# Patient Record
Sex: FEMALE | Race: WHITE | NOT HISPANIC OR LATINO | Employment: FULL TIME | URBAN - METROPOLITAN AREA
[De-identification: names, ages, dates, MRNs, and addresses within clinical notes are randomized per-mention and may not be internally consistent; named-entity substitution may affect disease eponyms.]

---

## 2017-08-17 ENCOUNTER — CLINICAL SUPPORT (OUTPATIENT)
Dept: OTHER | Facility: CLINIC | Age: 37
End: 2017-08-17
Payer: COMMERCIAL

## 2017-08-17 VITALS
SYSTOLIC BLOOD PRESSURE: 164 MMHG | HEIGHT: 66 IN | DIASTOLIC BLOOD PRESSURE: 74 MMHG | WEIGHT: 178 LBS | BODY MASS INDEX: 28.61 KG/M2

## 2017-08-17 DIAGNOSIS — Z00.8 HEALTH EXAMINATION IN POPULATION SURVEYS: Primary | ICD-10-CM

## 2017-08-17 LAB
GLUCOSE SERPL-MCNC: 105 MG/DL (ref 60–140)
POC CHOLESTEROL, HDL: 52 MG/DL (ref 40–?)
POC CHOLESTEROL, LDL: 110 MG/DL (ref ?–160)
POC CHOLESTEROL, TOTAL: 188 MG/DL (ref ?–240)
POC GLUCOSE FASTING: NORMAL MG/DL (ref 60–110)
POC TOTAL CHOLESTEROL / HDL RATIO: 3.6 (ref ?–6)
POC TRIGLYCERIDES: 130 MG/DL (ref ?–160)

## 2017-08-17 PROCEDURE — 82947 ASSAY GLUCOSE BLOOD QUANT: CPT | Mod: QW,S$GLB,, | Performed by: INTERNAL MEDICINE

## 2017-08-17 PROCEDURE — 99401 PREV MED CNSL INDIV APPRX 15: CPT | Mod: S$GLB,,, | Performed by: INTERNAL MEDICINE

## 2017-08-17 PROCEDURE — 80061 LIPID PANEL: CPT | Mod: QW,S$GLB,, | Performed by: INTERNAL MEDICINE

## 2017-10-16 ENCOUNTER — OFFICE VISIT (OUTPATIENT)
Dept: OBSTETRICS AND GYNECOLOGY | Facility: CLINIC | Age: 37
End: 2017-10-16
Payer: COMMERCIAL

## 2017-10-16 ENCOUNTER — LAB VISIT (OUTPATIENT)
Dept: LAB | Facility: HOSPITAL | Age: 37
End: 2017-10-16
Attending: OBSTETRICS & GYNECOLOGY
Payer: COMMERCIAL

## 2017-10-16 VITALS
WEIGHT: 184.06 LBS | SYSTOLIC BLOOD PRESSURE: 106 MMHG | HEIGHT: 66 IN | BODY MASS INDEX: 29.58 KG/M2 | DIASTOLIC BLOOD PRESSURE: 72 MMHG

## 2017-10-16 DIAGNOSIS — N91.2 AMENORRHEA: ICD-10-CM

## 2017-10-16 DIAGNOSIS — R21 RASH: ICD-10-CM

## 2017-10-16 DIAGNOSIS — N91.2 AMENORRHEA: Primary | ICD-10-CM

## 2017-10-16 LAB
ABO + RH BLD: NORMAL
B-HCG UR QL: POSITIVE
BASOPHILS # BLD AUTO: 0.04 K/UL
BASOPHILS NFR BLD: 0.5 %
BLD GP AB SCN CELLS X3 SERPL QL: NORMAL
CTP QC/QA: YES
DIFFERENTIAL METHOD: ABNORMAL
EOSINOPHIL # BLD AUTO: 0.2 K/UL
EOSINOPHIL NFR BLD: 2.6 %
ERYTHROCYTE [DISTWIDTH] IN BLOOD BY AUTOMATED COUNT: 14.3 %
HCT VFR BLD AUTO: 38.2 %
HGB BLD-MCNC: 12.2 G/DL
IMM GRANULOCYTES # BLD AUTO: 0.03 K/UL
IMM GRANULOCYTES NFR BLD AUTO: 0.4 %
LYMPHOCYTES # BLD AUTO: 1.7 K/UL
LYMPHOCYTES NFR BLD: 22.2 %
MCH RBC QN AUTO: 26 PG
MCHC RBC AUTO-ENTMCNC: 31.9 G/DL
MCV RBC AUTO: 81 FL
MONOCYTES # BLD AUTO: 0.7 K/UL
MONOCYTES NFR BLD: 9.1 %
NEUTROPHILS # BLD AUTO: 4.9 K/UL
NEUTROPHILS NFR BLD: 65.2 %
NRBC BLD-RTO: 0 /100 WBC
PLATELET # BLD AUTO: 327 K/UL
PMV BLD AUTO: 10.4 FL
RBC # BLD AUTO: 4.69 M/UL
WBC # BLD AUTO: 7.56 K/UL

## 2017-10-16 PROCEDURE — 85025 COMPLETE CBC W/AUTO DIFF WBC: CPT

## 2017-10-16 PROCEDURE — 86703 HIV-1/HIV-2 1 RESULT ANTBDY: CPT

## 2017-10-16 PROCEDURE — 86900 BLOOD TYPING SEROLOGIC ABO: CPT

## 2017-10-16 PROCEDURE — 86850 RBC ANTIBODY SCREEN: CPT

## 2017-10-16 PROCEDURE — 81025 URINE PREGNANCY TEST: CPT | Mod: S$GLB,,, | Performed by: OBSTETRICS & GYNECOLOGY

## 2017-10-16 PROCEDURE — 86762 RUBELLA ANTIBODY: CPT

## 2017-10-16 PROCEDURE — 99203 OFFICE O/P NEW LOW 30 MIN: CPT | Mod: S$GLB,,, | Performed by: OBSTETRICS & GYNECOLOGY

## 2017-10-16 PROCEDURE — 87340 HEPATITIS B SURFACE AG IA: CPT

## 2017-10-16 PROCEDURE — 99999 PR PBB SHADOW E&M-EST. PATIENT-LVL III: CPT | Mod: PBBFAC,,, | Performed by: OBSTETRICS & GYNECOLOGY

## 2017-10-16 PROCEDURE — 86592 SYPHILIS TEST NON-TREP QUAL: CPT

## 2017-10-16 NOTE — PROGRESS NOTES
Chief Complaint: Absence of Menses     HPI:      Sarbjit Boateng is a 37 y.o.  who presents complaining of absence of menses.  She reports nausea and mood swings. Denies vaginal bleeding or vomiting other then when she induces vomiting to make herself feel better. H/o SABs x 2. Neither required treatment.     Past Medical History:   Diagnosis Date    Anxiety     Depression     Gestational diabetes      Past Surgical History:   Procedure Laterality Date    WISDOM TOOTH EXTRACTION       Social History   Substance Use Topics    Smoking status: Never Smoker    Smokeless tobacco: Never Used    Alcohol use 0.0 oz/week      Comment: occassional drinking, 3-4 in one sitting      Family History   Problem Relation Age of Onset    Cancer Mother 53     breast cancer    Hypertension Mother     Hyperlipidemia Mother     Diabetes Mother     Breast cancer Mother     Cancer Father      colon cancer, in remission    Colon cancer Father     Cancer Maternal Uncle      tongue cancer    Cancer Maternal Grandmother      ovarian cancer    Ovarian cancer Maternal Grandmother     Cancer Maternal Grandfather      liver cancer    Cancer Paternal Grandfather      colon cancer    Colon cancer Paternal Grandfather      OB History    Para Term  AB Living   4 1 1   2 1   SAB TAB Ectopic Multiple Live Births   2       1      # Outcome Date GA Lbr [place]/2nd Weight Sex Delivery Anes PTL Lv   4 Current            3 Term  40w0d  3.487 kg (7 lb 11 oz) F Vag-Spont   KENDALL   2 SAB            1 SAB                   ROS:     GENERAL: Denies weight gain or weight loss. Feeling well overall.   SKIN: Denies rash or lesions.   BREASTS: Denies lumps or nipple discharge. + tenderness.  ABDOMEN: Denies abdominal pain, constipation, diarrhea. nausea and rare vomiting  URINARY: Denies dysuria, hematuria. + frequency.  NEUROLOGIC: Denies syncope or weakness.   PSYCHIATRIC: Denies depression, anxiety or mood  "swings.    Physical Exam:      PHYSICAL EXAM:  /72   Ht 5' 6" (1.676 m)   Wt 83.5 kg (184 lb 1.4 oz)   LMP 2017   BMI 29.71 kg/m²   Body mass index is 29.71 kg/m².     APPEARANCE: Well nourished, well developed, in no acute distress.  PSYCH: Appropriate mood and affect.  EXTREMITIES: No edema.    Assessment/Plan:       ICD-10-CM ICD-9-CM    1. Amenorrhea N91.2 626.0 POCT urine pregnancy       RTC in 2 weeks for initial OB exam and U/S.    Counselin. Patient was counseled today on proper weight gain based on the [employer]'s recommendations based on her pre-pregnancy weight.   2. Discussed foods to avoid in pregnancy (i.e. sushi, fish that are high in mercury, deli meat, and unpasteurized cheeses).   3. Discussed prenatal vitamin options (i.e. stool softener, DHA).   4. Optional genetic testing discussed.   5. Safety of exercise discussed with patient, and continued active lifestyle encouraged.  6. Zika virus precautions for both patient and her spouse.  7. Normal course of prenatal care reviewed.  8. Medications safe in pregnancy discussed and list provided.    30 minutes of face-to-face discussion occurred during today's visit.     Use of the [employer] Patient Portal discussed and encouraged during today's visit.       "

## 2017-10-17 LAB
HBV SURFACE AG SERPL QL IA: NEGATIVE
HIV 1+2 AB+HIV1 P24 AG SERPL QL IA: NEGATIVE
RPR SER QL: NORMAL
RUBV IGG SER-ACNC: 33 IU/ML
RUBV IGG SER-IMP: REACTIVE

## 2017-10-18 ENCOUNTER — TELEPHONE (OUTPATIENT)
Dept: OBSTETRICS AND GYNECOLOGY | Facility: CLINIC | Age: 37
End: 2017-10-18

## 2017-10-18 NOTE — TELEPHONE ENCOUNTER
----- Message from Giulia Garber MD sent at 10/18/2017  8:41 AM CDT -----  Please let Sarbjit know that all of her prenatal labs look good and she's starting off this pregnancy with a clean bill of health.

## 2017-11-01 ENCOUNTER — ROUTINE PRENATAL (OUTPATIENT)
Dept: OBSTETRICS AND GYNECOLOGY | Facility: CLINIC | Age: 37
End: 2017-11-01
Payer: COMMERCIAL

## 2017-11-01 ENCOUNTER — PROCEDURE VISIT (OUTPATIENT)
Dept: OBSTETRICS AND GYNECOLOGY | Facility: CLINIC | Age: 37
End: 2017-11-01
Payer: COMMERCIAL

## 2017-11-01 ENCOUNTER — TELEPHONE (OUTPATIENT)
Dept: OBSTETRICS AND GYNECOLOGY | Facility: CLINIC | Age: 37
End: 2017-11-01

## 2017-11-01 VITALS
WEIGHT: 183.44 LBS | BODY MASS INDEX: 29.61 KG/M2 | SYSTOLIC BLOOD PRESSURE: 124 MMHG | DIASTOLIC BLOOD PRESSURE: 76 MMHG

## 2017-11-01 DIAGNOSIS — O46.8X1 SUBCHORIONIC HEMATOMA IN FIRST TRIMESTER, SINGLE OR UNSPECIFIED FETUS: ICD-10-CM

## 2017-11-01 DIAGNOSIS — N91.2 AMENORRHEA: ICD-10-CM

## 2017-11-01 DIAGNOSIS — Z12.4 PAP SMEAR FOR CERVICAL CANCER SCREENING: ICD-10-CM

## 2017-11-01 DIAGNOSIS — Z3A.08 8 WEEKS GESTATION OF PREGNANCY: Primary | ICD-10-CM

## 2017-11-01 DIAGNOSIS — O41.8X10 SUBCHORIONIC HEMATOMA IN FIRST TRIMESTER, SINGLE OR UNSPECIFIED FETUS: ICD-10-CM

## 2017-11-01 DIAGNOSIS — Z36.89 ESTABLISH GESTATIONAL AGE, ULTRASOUND: ICD-10-CM

## 2017-11-01 PROCEDURE — 87591 N.GONORRHOEAE DNA AMP PROB: CPT

## 2017-11-01 PROCEDURE — 99999 PR PBB SHADOW E&M-EST. PATIENT-LVL III: CPT | Mod: PBBFAC,,, | Performed by: OBSTETRICS & GYNECOLOGY

## 2017-11-01 PROCEDURE — 0500F INITIAL PRENATAL CARE VISIT: CPT | Mod: S$GLB,,, | Performed by: OBSTETRICS & GYNECOLOGY

## 2017-11-01 PROCEDURE — 88175 CYTOPATH C/V AUTO FLUID REDO: CPT

## 2017-11-01 PROCEDURE — 87624 HPV HI-RISK TYP POOLED RSLT: CPT

## 2017-11-01 PROCEDURE — 76817 TRANSVAGINAL US OBSTETRIC: CPT | Mod: S$GLB,,, | Performed by: OBSTETRICS & GYNECOLOGY

## 2017-11-01 NOTE — TELEPHONE ENCOUNTER
Shirlene ob pt - pt is 8 weeks and had an appt this morning. She said she had an u/s and pap done today and since she left has been cramping really bad and feeling dizzy like she has no energy. Pt would like to know what she should do.

## 2017-11-01 NOTE — TELEPHONE ENCOUNTER
8 1/7 week OB c/o cramping, dizziness and decreased energy.  She feels like her HR is high.  Denies vaginal bleeding.  Recommended she drink a couple bottles of water and rest.  Reassured her this sounds normal.

## 2017-11-01 NOTE — PROGRESS NOTES
8w1d without major complaints.   Prenatal exam, WNL, see prenatal tabs.   Patient would like Maternity 21, number given to call today.   RTC in 4 weeks for routine PNC.

## 2017-11-01 NOTE — PROCEDURES
Procedures   Obstetrical ultrasound completed today.  See report in imaging section of [facility].

## 2017-11-02 LAB
C TRACH DNA SPEC QL NAA+PROBE: NOT DETECTED
N GONORRHOEA DNA SPEC QL NAA+PROBE: NOT DETECTED

## 2017-11-06 ENCOUNTER — TELEPHONE (OUTPATIENT)
Dept: OBSTETRICS AND GYNECOLOGY | Facility: CLINIC | Age: 37
End: 2017-11-06

## 2017-11-06 LAB
HPV16 AG SPEC QL: NEGATIVE
HPV16+18+H RISK 12 DNA CVX-IMP: NEGATIVE
HPV18 DNA SPEC QL NAA+PROBE: NEGATIVE

## 2017-11-06 RX ORDER — ONDANSETRON 4 MG/1
4 TABLET, FILM COATED ORAL DAILY PRN
Qty: 30 TABLET | Refills: 1 | Status: SHIPPED | OUTPATIENT
Start: 2017-11-06 | End: 2017-11-27 | Stop reason: SDUPTHER

## 2017-11-06 NOTE — TELEPHONE ENCOUNTER
Dr Garber pt's  calling Claudio they were seen on last Wednesday and nausea meds were suppose to be sent in.They are still waiting and need meds.Please call Claudio to let him know when this is sent.# [phone] Pharm Devin [phone]#

## 2017-11-27 ENCOUNTER — ROUTINE PRENATAL (OUTPATIENT)
Dept: OBSTETRICS AND GYNECOLOGY | Facility: CLINIC | Age: 37
End: 2017-11-27
Payer: COMMERCIAL

## 2017-11-27 VITALS — BODY MASS INDEX: 29.78 KG/M2 | DIASTOLIC BLOOD PRESSURE: 74 MMHG | WEIGHT: 184.5 LBS | SYSTOLIC BLOOD PRESSURE: 116 MMHG

## 2017-11-27 DIAGNOSIS — Z3A.11 11 WEEKS GESTATION OF PREGNANCY: Primary | ICD-10-CM

## 2017-11-27 DIAGNOSIS — O09.521 ELDERLY MULTIGRAVIDA IN FIRST TRIMESTER: ICD-10-CM

## 2017-11-27 PROCEDURE — 0502F SUBSEQUENT PRENATAL CARE: CPT | Mod: S$GLB,,, | Performed by: OBSTETRICS & GYNECOLOGY

## 2017-11-27 PROCEDURE — 99999 PR PBB SHADOW E&M-EST. PATIENT-LVL II: CPT | Mod: PBBFAC,,, | Performed by: OBSTETRICS & GYNECOLOGY

## 2017-11-27 PROCEDURE — 90686 IIV4 VACC NO PRSV 0.5 ML IM: CPT | Mod: S$GLB,,, | Performed by: OBSTETRICS & GYNECOLOGY

## 2017-11-27 PROCEDURE — 90471 IMMUNIZATION ADMIN: CPT | Mod: S$GLB,,, | Performed by: OBSTETRICS & GYNECOLOGY

## 2017-11-27 RX ORDER — ONDANSETRON 4 MG/1
4 TABLET, FILM COATED ORAL DAILY PRN
Qty: 30 TABLET | Refills: 1 | Status: SHIPPED | OUTPATIENT
Start: 2017-11-27 | End: 2018-01-31 | Stop reason: SDUPTHER

## 2017-11-27 NOTE — PROGRESS NOTES
11w6d without major complaints.   Has decided to have quad screen rather than Maternity 21 as latter is not going to be covered by her insurance.   Flu shot given today.   RTC in 1 month for routine PNC and quad screen.

## 2018-01-03 ENCOUNTER — LAB VISIT (OUTPATIENT)
Dept: LAB | Facility: HOSPITAL | Age: 38
End: 2018-01-03
Attending: OBSTETRICS & GYNECOLOGY
Payer: COMMERCIAL

## 2018-01-03 ENCOUNTER — ROUTINE PRENATAL (OUTPATIENT)
Dept: OBSTETRICS AND GYNECOLOGY | Facility: CLINIC | Age: 38
End: 2018-01-03
Payer: COMMERCIAL

## 2018-01-03 VITALS
DIASTOLIC BLOOD PRESSURE: 76 MMHG | SYSTOLIC BLOOD PRESSURE: 118 MMHG | BODY MASS INDEX: 29.84 KG/M2 | WEIGHT: 184.88 LBS

## 2018-01-03 DIAGNOSIS — Z3A.17 17 WEEKS GESTATION OF PREGNANCY: ICD-10-CM

## 2018-01-03 DIAGNOSIS — O09.521 ELDERLY MULTIGRAVIDA IN FIRST TRIMESTER: Primary | ICD-10-CM

## 2018-01-03 PROCEDURE — 81511 FTL CGEN ABNOR FOUR ANAL: CPT

## 2018-01-03 PROCEDURE — 0502F SUBSEQUENT PRENATAL CARE: CPT | Mod: S$GLB,,, | Performed by: OBSTETRICS & GYNECOLOGY

## 2018-01-03 PROCEDURE — 99999 PR PBB SHADOW E&M-EST. PATIENT-LVL II: CPT | Mod: PBBFAC,,, | Performed by: OBSTETRICS & GYNECOLOGY

## 2018-01-03 RX ORDER — LORATADINE 10 MG/1
10 TABLET ORAL DAILY
Status: ON HOLD | COMMUNITY
End: 2018-06-05 | Stop reason: HOSPADM

## 2018-01-03 NOTE — PROGRESS NOTES
17w1d without major complaints.  Quad screen drawn today.   Anatomy U/S ordered today.  RTC in 4 weeks for routine PNC.

## 2018-01-05 ENCOUNTER — TELEPHONE (OUTPATIENT)
Dept: OBSTETRICS AND GYNECOLOGY | Facility: CLINIC | Age: 38
End: 2018-01-05

## 2018-01-05 LAB
# FETUSES US: NORMAL
2ND TRIMESTER 4 SCREEN PNL SERPL: NEGATIVE
2ND TRIMESTER 4 SCREEN SERPL-IMP: NORMAL
AFP MOM SERPL: 0.8
AFP SERPL-MCNC: 28.2 NG/ML
AGE AT DELIVERY: 38
B-HCG MOM SERPL: 0.58
B-HCG SERPL-ACNC: 15.2 IU/ML
FET TS 21 RISK FROM MAT AGE: NORMAL
GA (DAYS): 1 D
GA (WEEKS): 17 WK
GA METHOD: NORMAL
IDDM PATIENT QL: NORMAL
INHIBIN A MOM SERPL: 0.76
INHIBIN A SERPL-MCNC: 112.6 PG/ML
SMOKING STATUS FTND: NO
TS 18 RISK FETUS: NORMAL
TS 21 RISK FETUS: NORMAL
U ESTRIOL MOM SERPL: 1.1
U ESTRIOL SERPL-MCNC: 1.1 NG/ML

## 2018-01-05 NOTE — TELEPHONE ENCOUNTER
----- Message from Giulia Garber MD sent at 1/5/2018 10:33 AM CST -----  Please call Sarbjit and let her know that her genetic screen is negative, everything looks good, and we'll see her in a few weeks!

## 2018-01-25 ENCOUNTER — OFFICE VISIT (OUTPATIENT)
Dept: MATERNAL FETAL MEDICINE | Facility: CLINIC | Age: 38
End: 2018-01-25
Attending: OBSTETRICS & GYNECOLOGY
Payer: COMMERCIAL

## 2018-01-25 DIAGNOSIS — Z3A.17 17 WEEKS GESTATION OF PREGNANCY: ICD-10-CM

## 2018-01-25 DIAGNOSIS — Z36.89 ENCOUNTER FOR ULTRASOUND TO CHECK FETAL GROWTH: ICD-10-CM

## 2018-01-25 PROCEDURE — 76811 OB US DETAILED SNGL FETUS: CPT | Mod: S$GLB,,, | Performed by: OBSTETRICS & GYNECOLOGY

## 2018-01-25 PROCEDURE — 99499 UNLISTED E&M SERVICE: CPT | Mod: S$GLB,,, | Performed by: OBSTETRICS & GYNECOLOGY

## 2018-01-25 PROCEDURE — 99999 PR PBB SHADOW E&M-EST. PATIENT-LVL I: CPT | Mod: PBBFAC,,, | Performed by: OBSTETRICS & GYNECOLOGY

## 2018-01-25 NOTE — PROGRESS NOTES
See ultrasound report for details    A detailed fetal anatomic ultrasound examination was performed for the following high risk indication: AMA    There is a small, sub-chorionic hematoma in the lower segment which is expected to resolve  No fetal structural malformations are identified; however, fetal imaging is incomplete today.   A follow-up study will be scheduled to complete the fetal anatomic survey.  Fetal size today is consistent with established gestational age. Cervical length is normal. Placental location is normal without evidence of previa.

## 2018-01-25 NOTE — LETTER
January 25, 2018      Giulia Garber MD  [address]  [address]  [place] [address]           [facility]  [address] [address]  Phone: [phone]          Patient: Sarbjit Boateng   MR Number: [id number]   YOB: 1980   Date of Visit: 1/25/2018       Dear Dr. Giulia Garber:    Thank you for referring Sarbjit Boateng to me for evaluation. Attached you will find relevant portions of my assessment and plan of care.    If you have questions, please do not hesitate to call me. I look forward to following Sarbjit Boateng along with you.    Sincerely,    Olivier Toro III, MD    Enclosure  CC:  No Recipients    If you would like to receive this communication electronically, please contact [email or web address] or [phone] to request more information on [employer] Link access.    For providers and/or their staff who would like to refer a patient to [facility], please contact us through our one-stop-shop provider referral line, [facility], at [phone].    If you feel you have received this communication in error or would no longer like to receive these types of communications, please e-mail [email or web address]

## 2018-01-29 NOTE — PROGRESS NOTES
21w1d without major complaints.   Anatomy U/S incomplete, follow up scheduled for 2/22.   RTC in 4 weeks for routine PNC.  1 hr GCT and CBC at next visit.

## 2018-01-31 ENCOUNTER — ROUTINE PRENATAL (OUTPATIENT)
Dept: OBSTETRICS AND GYNECOLOGY | Facility: CLINIC | Age: 38
End: 2018-01-31
Payer: COMMERCIAL

## 2018-01-31 VITALS
DIASTOLIC BLOOD PRESSURE: 64 MMHG | WEIGHT: 185.94 LBS | BODY MASS INDEX: 30.01 KG/M2 | SYSTOLIC BLOOD PRESSURE: 116 MMHG

## 2018-01-31 DIAGNOSIS — Z3A.21 21 WEEKS GESTATION OF PREGNANCY: ICD-10-CM

## 2018-01-31 DIAGNOSIS — O09.522 ELDERLY MULTIGRAVIDA IN SECOND TRIMESTER: Primary | ICD-10-CM

## 2018-01-31 PROCEDURE — 99999 PR PBB SHADOW E&M-EST. PATIENT-LVL II: CPT | Mod: PBBFAC,,, | Performed by: OBSTETRICS & GYNECOLOGY

## 2018-01-31 PROCEDURE — 0502F SUBSEQUENT PRENATAL CARE: CPT | Mod: S$GLB,,, | Performed by: OBSTETRICS & GYNECOLOGY

## 2018-01-31 RX ORDER — ONDANSETRON 4 MG/1
4 TABLET, FILM COATED ORAL DAILY PRN
Qty: 30 TABLET | Refills: 1 | Status: SHIPPED | OUTPATIENT
Start: 2018-01-31 | End: 2018-03-28 | Stop reason: SDUPTHER

## 2018-02-07 ENCOUNTER — TELEPHONE (OUTPATIENT)
Dept: OBSTETRICS AND GYNECOLOGY | Facility: CLINIC | Age: 38
End: 2018-02-07

## 2018-02-07 RX ORDER — ONDANSETRON 4 MG/1
4 TABLET, ORALLY DISINTEGRATING ORAL EVERY 6 HOURS PRN
Qty: 30 TABLET | Refills: 1 | Status: ON HOLD | OUTPATIENT
Start: 2018-02-07 | End: 2018-06-05 | Stop reason: HOSPADM

## 2018-02-07 NOTE — TELEPHONE ENCOUNTER
Shirlene pt  states he and the pt are traveling internationally and the pharm will not let them renew her Rx for ondansetron (ZOFRAN, AS HYDROCHLORIDE,) 4 MG tablet. Pharm told him that if the Dr could re-write the Rx they could possible refill it.     can be reached at:[phone]

## 2018-02-22 ENCOUNTER — OFFICE VISIT (OUTPATIENT)
Dept: MATERNAL FETAL MEDICINE | Facility: CLINIC | Age: 38
End: 2018-02-22
Payer: COMMERCIAL

## 2018-02-22 DIAGNOSIS — Z36.89 ENCOUNTER FOR ULTRASOUND TO CHECK FETAL GROWTH: ICD-10-CM

## 2018-02-22 DIAGNOSIS — O09.522 ELDERLY MULTIGRAVIDA IN SECOND TRIMESTER: ICD-10-CM

## 2018-02-22 PROCEDURE — 99499 UNLISTED E&M SERVICE: CPT | Mod: S$GLB,,, | Performed by: OBSTETRICS & GYNECOLOGY

## 2018-02-22 PROCEDURE — 76816 OB US FOLLOW-UP PER FETUS: CPT | Mod: S$GLB,,, | Performed by: OBSTETRICS & GYNECOLOGY

## 2018-02-22 NOTE — PROGRESS NOTES
OB Ultrasound Report (see PDF version under imaging tab)    Indication  ========    Follow-up evaluation of anatomy and growth.    History  ======    General History  Other: MARBIN VILLAGOMEZ    Pregnancy History  ===============    Maternal Lab Tests  Test: Quad/ Penta Screen  Result: negative, DSR 1:2900, T18 1:7100  Wants to know gender: yes    Method  ======    Transabdominal ultrasound examination. View: Sufficient.    Pregnancy  =========    Harvey pregnancy. Number of fetuses: 1.    Dating  ======    Cycle: regular cycle  Ultrasound examination on: 2/22/2018  GA by U/S based upon: AC, BPD, Femur, HC  GA by U/S 25 w + 4 d  ANITA by U/S: 6/3/2018  Assigned: Dating performed on 11/1/2017, based on the LMP  Assigned GA 24 w + 2 d  Assigned ANITA: 6/12/2018    General Evaluation  ===============    Cardiac activity: present.  bpm.  Fetal movements: visualized.  Presentation: cephalic.  Placenta:  Placental site: anterior.  Umbilical cord: Cord vessels: 3 vessel cord.  Amniotic fluid: Amount of AF: normal amount. MVP 5.0 cm.    Fetal Biometry  ===========    Fetal Biometry  BPD 61.5 mm 25w 0d Hadlock  OFD 82.4 mm 26w 6d Tomy  .3 mm 25w 2d Hadlock  .0 mm 25w 3d Hadlock  Femur 49.0 mm 26w 3d Hadlock   g 72% Lon  Calculated by: Hadlock (BPD-HC-AC-FL)  EFW (lb) 1 lb  EFW (oz) 14 oz  Cephalic index 0.75  HC / AC 1.11  FL / BPD 0.80  FL / AC 0.23  MVP 5.0 cm   bpm  Head / Face / Neck   4.8 mm    Fetal Anatomy  ===========    Cranium: normal  Lateral ventricles: normal  Profile: normal  4-chamber view: normal  Stomach: normal  Kidneys: normal  Bladder: normal  Genitals: normal  Rt renal artery: visualized  Lt renal artery: visualized  Cervical spine: normal  Thoracic spine: normal  Lumbar spine: normal  Sacral spine: normal  Gender: female  Wants to know gender: yes    Impression  =========    A follow up fetal anatomical ultrasound was completed today.  The fetal anatomic  survey was completed today, and no fetal structural abnormalities were noted. Interval fetal growth has been  normal, and the AFV is normal.

## 2018-02-28 ENCOUNTER — TELEPHONE (OUTPATIENT)
Dept: OBSTETRICS AND GYNECOLOGY | Facility: CLINIC | Age: 38
End: 2018-02-28

## 2018-02-28 DIAGNOSIS — Z34.90 PREGNANCY, UNSPECIFIED GESTATIONAL AGE: Primary | ICD-10-CM

## 2018-03-05 NOTE — PROGRESS NOTES
26w1d without major complaints.   1 hour GCT and CBC collected today.   RTC in 4 weeks for routine PNC.

## 2018-03-06 ENCOUNTER — TELEPHONE (OUTPATIENT)
Dept: OBSTETRICS AND GYNECOLOGY | Facility: CLINIC | Age: 38
End: 2018-03-06

## 2018-03-06 ENCOUNTER — HOSPITAL ENCOUNTER (EMERGENCY)
Facility: OTHER | Age: 38
Discharge: HOME | End: 2018-03-06
Attending: OBSTETRICS & GYNECOLOGY
Payer: COMMERCIAL

## 2018-03-06 VITALS
RESPIRATION RATE: 18 BRPM | OXYGEN SATURATION: 98 % | SYSTOLIC BLOOD PRESSURE: 105 MMHG | DIASTOLIC BLOOD PRESSURE: 57 MMHG | HEART RATE: 90 BPM

## 2018-03-06 DIAGNOSIS — Z3A.26 26 WEEKS GESTATION OF PREGNANCY: ICD-10-CM

## 2018-03-06 DIAGNOSIS — O47.9 BRAXTON HICK'S CONTRACTION: Primary | ICD-10-CM

## 2018-03-06 PROCEDURE — 59025 FETAL NON-STRESS TEST: CPT | Mod: 26,,, | Performed by: OBSTETRICS & GYNECOLOGY

## 2018-03-06 PROCEDURE — 59025 FETAL NON-STRESS TEST: CPT

## 2018-03-06 PROCEDURE — 99284 EMERGENCY DEPT VISIT MOD MDM: CPT | Mod: 25,,, | Performed by: OBSTETRICS & GYNECOLOGY

## 2018-03-06 PROCEDURE — 99284 EMERGENCY DEPT VISIT MOD MDM: CPT | Mod: 25

## 2018-03-06 NOTE — ED NOTES
Pt presents to [facility] with complaints of contractions for past 2 days. Pt reports +fetal movements, denies leaking of fluid and vaginal bleeding. Pt does report an increase in vaginal discharge over last few days. Dr freire notified

## 2018-03-06 NOTE — TELEPHONE ENCOUNTER
Shirlene ob pt - pt is 26 weeks, she started yest with abdominal cramping on and off and back pain. Today it is more intense and it feels like contractions. She would like to know what she should do.   Per Joelle I advised the pt to drink 2 bottles of water.

## 2018-03-06 NOTE — TELEPHONE ENCOUNTER
26 week OB c/o cramping that started around 2100 last night.  Denies vaginal bleeding but reports back pain that started 2 days ago.  She reports adequate hydration and increased movement around cervix.  Recommended she go to the [facility].

## 2018-03-07 ENCOUNTER — ROUTINE PRENATAL (OUTPATIENT)
Dept: OBSTETRICS AND GYNECOLOGY | Facility: CLINIC | Age: 38
End: 2018-03-07
Payer: COMMERCIAL

## 2018-03-07 ENCOUNTER — LAB VISIT (OUTPATIENT)
Dept: LAB | Facility: HOSPITAL | Age: 38
End: 2018-03-07
Attending: OBSTETRICS & GYNECOLOGY
Payer: COMMERCIAL

## 2018-03-07 VITALS
DIASTOLIC BLOOD PRESSURE: 70 MMHG | SYSTOLIC BLOOD PRESSURE: 114 MMHG | BODY MASS INDEX: 30.05 KG/M2 | WEIGHT: 186.19 LBS

## 2018-03-07 DIAGNOSIS — Z3A.26 26 WEEKS GESTATION OF PREGNANCY: ICD-10-CM

## 2018-03-07 DIAGNOSIS — Z34.90 PREGNANCY, UNSPECIFIED GESTATIONAL AGE: ICD-10-CM

## 2018-03-07 DIAGNOSIS — Z3A.21 21 WEEKS GESTATION OF PREGNANCY: ICD-10-CM

## 2018-03-07 DIAGNOSIS — O09.522 ELDERLY MULTIGRAVIDA IN SECOND TRIMESTER: Primary | ICD-10-CM

## 2018-03-07 LAB
BASOPHILS # BLD AUTO: 0.03 K/UL
BASOPHILS NFR BLD: 0.3 %
DIFFERENTIAL METHOD: ABNORMAL
EOSINOPHIL # BLD AUTO: 0.2 K/UL
EOSINOPHIL NFR BLD: 2.2 %
ERYTHROCYTE [DISTWIDTH] IN BLOOD BY AUTOMATED COUNT: 14.4 %
GLUCOSE SERPL-MCNC: 223 MG/DL
HCT VFR BLD AUTO: 32.1 %
HGB BLD-MCNC: 10.4 G/DL
IMM GRANULOCYTES # BLD AUTO: 0.08 K/UL
IMM GRANULOCYTES NFR BLD AUTO: 0.9 %
LYMPHOCYTES # BLD AUTO: 1.3 K/UL
LYMPHOCYTES NFR BLD: 13.8 %
MCH RBC QN AUTO: 29.9 PG
MCHC RBC AUTO-ENTMCNC: 32.4 G/DL
MCV RBC AUTO: 92 FL
MONOCYTES # BLD AUTO: 0.5 K/UL
MONOCYTES NFR BLD: 6 %
NEUTROPHILS # BLD AUTO: 7 K/UL
NEUTROPHILS NFR BLD: 76.8 %
NRBC BLD-RTO: 0 /100 WBC
PLATELET # BLD AUTO: 254 K/UL
PMV BLD AUTO: 10.1 FL
RBC # BLD AUTO: 3.48 M/UL
WBC # BLD AUTO: 9.06 K/UL

## 2018-03-07 PROCEDURE — 85025 COMPLETE CBC W/AUTO DIFF WBC: CPT

## 2018-03-07 PROCEDURE — 82950 GLUCOSE TEST: CPT

## 2018-03-07 PROCEDURE — 99999 PR PBB SHADOW E&M-EST. PATIENT-LVL II: CPT | Mod: PBBFAC,,, | Performed by: OBSTETRICS & GYNECOLOGY

## 2018-03-07 PROCEDURE — 0502F SUBSEQUENT PRENATAL CARE: CPT | Mod: S$GLB,,, | Performed by: OBSTETRICS & GYNECOLOGY

## 2018-03-07 NOTE — ED PROVIDER NOTES
Encounter Date: 3/6/2018       History     Chief Complaint   Patient presents with    Contractions     Sarbjit Boateng is a 37 y.o. [id number] at 26w0d presenting with uterine contractions. Patient reports that they began around 11 am this morning. Had Tillman Kuhn with her prior pregnancy, but they did not start until 36 weeks. Patient reports that since arriving at the ED the contractions have subsided. Patient reports increased vaginal discharge but not gush of fluid. No vaginal bleeding. Normal fetal movement.           Review of patient's allergies indicates:  No Known Allergies  Past Medical History:   Diagnosis Date    Anxiety     Depression     Gestational diabetes      Past Surgical History:   Procedure Laterality Date    WISDOM TOOTH EXTRACTION       Family History   Problem Relation Age of Onset    Cancer Mother 53     breast cancer    Hypertension Mother     Hyperlipidemia Mother     Diabetes Mother     Breast cancer Mother     Cancer Father      colon cancer, in remission    Colon cancer Father     Cancer Maternal Uncle      tongue cancer    Cancer Maternal Grandmother      ovarian cancer    Ovarian cancer Maternal Grandmother     Cancer Maternal Grandfather      liver cancer    Cancer Paternal Grandfather      colon cancer    Colon cancer Paternal Grandfather      Social History   Substance Use Topics    Smoking status: Never Smoker    Smokeless tobacco: Never Used    Alcohol use 0.0 oz/week      Comment: occassional drinking, 3-4 in one sitting      Review of Systems   Constitutional: Negative for chills and fever.   HENT: Negative for congestion.    Eyes: Negative for visual disturbance.   Respiratory: Negative for shortness of breath.    Cardiovascular: Negative for chest pain.   Gastrointestinal: Positive for abdominal pain. Negative for diarrhea, nausea and vomiting.   Genitourinary: Positive for vaginal discharge. Negative for dysuria, hematuria and vaginal bleeding.    Musculoskeletal: Negative for back pain.   Skin: Negative for rash.   Neurological: Negative for dizziness and headaches.       Physical Exam     Initial Vitals [03/06/18 1737]   BP Pulse Resp Temp SpO2   (!) 105/57 95 18 -- 99 %      MAP       73         Physical Exam    Vitals reviewed.  Constitutional: She appears well-developed and well-nourished. She is not diaphoretic. No distress.   HENT:   Head: Normocephalic and atraumatic.   Eyes: EOM are normal.   Cardiovascular: Normal rate.   Pulmonary/Chest: No respiratory distress.   Abdominal: Soft. She exhibits no distension. There is no tenderness. There is no rebound and no guarding.   Musculoskeletal: Normal range of motion.   Neurological: She is alert and oriented to person, place, and time.   Skin: Skin is warm and dry.   Psychiatric: She has a normal mood and affect. Her behavior is normal. Judgment and thought content normal.     OB LABOR EXAM:   Pre-Term Labor: No.   Membranes ruptured: No.   Method: Sterile vaginal exam per MD and Sterile speculum exam per MD.   Vaginal Bleeding: none present.     Dilatation: 0.   Station: -3.   Effacement: 50%.   Amniotic Fluid Color: no fluid.   Amniotic Fluid Amount: none noted.         ED Course   Obtain Fetal nonstress test (NST)  Date/Time: 3/6/2018 7:49 PM  Performed by: CASSIE BARRIGA  Authorized by: CASSIE BARRIGA     Nonstress Test:     Variability:  6-25 BPM    Decelerations:  None    Accelerations:  15 bpm    Baseline:  135    Uterine Irritability: No      Contractions:  Not present  Biophysical Profile:     Nonstress Test Interpretation: reactive      Overall Impression:  Reassuring      Labs Reviewed - No data to display          Medical Decision Making:   ED Management:  No contractions in [facility]  Cervix .5 cm dilated, thick, high  - nitrazine, ferning, pooling  Wet prep shows no abnormalities  Dc home with labor precautions  Other:   I have discussed this case with another health care provider.       <>  Summary of the Discussion: Staffed with Dr. Lona Mejia              Attending Attestation:   Physician Attestation Statement for Resident:  As the supervising MD   Physician Attestation Statement: I have personally seen and examined this patient.   I agree with the above history. -:   As the supervising MD I agree with the above PE.    As the supervising MD I agree with the above treatment, course, plan, and disposition.   -:   NST  I independently reviewed the fetal non-stress test with the following interpretation:  135 BPM baseline  Variability: moderate  Accelerations: present  Decelerations: absent  Contractions: none  Category 1    Clinical Interpretation:age appropriate    Patient evaluated and found to be stable, agree with resident's assessment of contractions at 26 weeks, no resolved with no s/s  labor and plan to discharge to home with  labor precautions.  I was personally present during the critical portions of the procedure(s) performed by the resident and was immediately available in the ED to provide services and assistance as needed during the entire procedure.  I have reviewed the following: old records at this facility.                       Clinical Impression:   The primary encounter diagnosis was [facility] contraction. A diagnosis of 26 weeks gestation of pregnancy was also pertinent to this visit.    Disposition:   Disposition: Discharged  Condition: Stable    Moi Maria MD  PGY1, OBGYN [facility]                   Moi Maria MD  Resident  18       Lona Mejia MD  [phone]

## 2018-03-09 ENCOUNTER — TELEPHONE (OUTPATIENT)
Dept: OBSTETRICS AND GYNECOLOGY | Facility: CLINIC | Age: 38
End: 2018-03-09

## 2018-03-09 DIAGNOSIS — O99.810 ABNORMAL GLUCOSE TOLERANCE TEST (GTT) DURING PREGNANCY, ANTEPARTUM: Primary | ICD-10-CM

## 2018-03-09 NOTE — TELEPHONE ENCOUNTER
----- Message from Giulia Garber MD sent at 3/9/2018  9:53 AM CST -----  Please call the patient and let her know that   The results of her gestational diabetes test have come back, and unfortunately, the glucose levels were a little high. We need to do a second test to see whether or not she truly does have gestational diabetes. This next test is a 3 hour test. We will check her glucose levels when you she first arrives, then she'll drink some more of that delightful glucose solution and we will draw her blood at 1, 2, and 3 hours.     It's important that she is fasting when she get this one done, so nothing to eat and only water to drink after midnight the night before her test.     Please help- her set up a time to come have the test done.

## 2018-03-13 ENCOUNTER — LAB VISIT (OUTPATIENT)
Dept: LAB | Facility: HOSPITAL | Age: 38
End: 2018-03-13
Attending: OBSTETRICS & GYNECOLOGY
Payer: COMMERCIAL

## 2018-03-13 DIAGNOSIS — O99.810 ABNORMAL GLUCOSE TOLERANCE TEST (GTT) DURING PREGNANCY, ANTEPARTUM: ICD-10-CM

## 2018-03-13 LAB
GLUCOSE SERPL-MCNC: 109 MG/DL
GLUCOSE SERPL-MCNC: 161 MG/DL
GLUCOSE SERPL-MCNC: 204 MG/DL
GLUCOSE SERPL-MCNC: 93 MG/DL

## 2018-03-13 PROCEDURE — 82951 GLUCOSE TOLERANCE TEST (GTT): CPT

## 2018-03-13 PROCEDURE — 82952 GTT-ADDED SAMPLES: CPT

## 2018-03-14 ENCOUNTER — TELEPHONE (OUTPATIENT)
Dept: OBSTETRICS AND GYNECOLOGY | Facility: CLINIC | Age: 38
End: 2018-03-14

## 2018-03-14 DIAGNOSIS — O24.419 GESTATIONAL DIABETES MELLITUS (GDM) IN SECOND TRIMESTER, GESTATIONAL DIABETES METHOD OF CONTROL UNSPECIFIED: Primary | ICD-10-CM

## 2018-03-14 NOTE — TELEPHONE ENCOUNTER
Patient notified of 3hr gtt results per Dr. Garber, verbalized understanding. Will contact the office on Friday if she has not been called to setup her gest. Diabetes education class.

## 2018-03-14 NOTE — TELEPHONE ENCOUNTER
----- Message from Giulia Garber MD sent at 3/14/2018  8:46 AM CDT -----  Nick,   Can you please call this patient and let her know that unfortunately it looks like she does indeed have gestational diabetes. The diabetes educators should be setting her up an appointment in the next week. Please ask her to let us know if she hasn't heard from them by Friday. Then encourage her to increase her exercise and carefully monitor her carbohydrate intake in the meantime.   Thank you!

## 2018-03-19 ENCOUNTER — HOSPITAL ENCOUNTER (OUTPATIENT)
Dept: DIABETES | Facility: OTHER | Age: 38
Discharge: HOME | End: 2018-03-19
Attending: OBSTETRICS & GYNECOLOGY
Payer: COMMERCIAL

## 2018-03-19 VITALS — WEIGHT: 186.31 LBS | BODY MASS INDEX: 29.94 KG/M2 | HEIGHT: 66 IN

## 2018-03-19 DIAGNOSIS — O24.419 GESTATIONAL DIABETES MELLITUS (GDM) IN SECOND TRIMESTER, GESTATIONAL DIABETES METHOD OF CONTROL UNSPECIFIED: Primary | ICD-10-CM

## 2018-03-19 PROCEDURE — G0108 DIAB MANAGE TRN  PER INDIV: HCPCS | Performed by: DIETITIAN, REGISTERED

## 2018-03-19 RX ORDER — LANCETS 33 GAUGE
1 EACH MISCELLANEOUS 4 TIMES DAILY
Qty: 100 EACH | Refills: 4 | Status: ON HOLD | OUTPATIENT
Start: 2018-03-19 | End: 2018-06-05 | Stop reason: HOSPADM

## 2018-03-19 NOTE — PROGRESS NOTES
Diabetes Education  Author: Bianka Ritter RD  Date: 3/19/2018    Diabetes Education Visit  Diabetes Education Record Assessment/Progress: Initial    Diabetes Type  Diabetes Type : Gestational (Hx of GDM with 1st pregnancy 5+years ago)    Diabetes History  Diabetes Diagnosis: 0-1 year    Nutrition  Meal Planning: eats out seldom, water, 3 meals per day, diet drinks, artificial sweeteners, snacks between meal (usually eats out for lunch)  What type of sweetener do you use?: Equal  What type of beverages do you drink?: diet soda/tea, milk, water (almond milk with regular sugar)  Meal Plan 24 Hour Recall - Breakfast: 1 banana, usuaully advocado toast-2 pieces with water  Meal Plan 24 Hour Recall - Lunch: Noodles or rice bowl with chicken or pork and vegetables with diet soda, water or tea with or w/out equal  Meal Plan 24 Hour Recall - Dinner: 2 pieces of advocado toast with salsa with diet soda, usually has rice or noodle bowl with meat and vegetables  Meal Plan 24 Hour Recall - Snack: nuts and bananas and oranges    Monitoring   In the last month, how often have you had a low blood sugar reaction?: never  What are your symptoms of low blood sugar?: after eating sugar for breakfast will become sweaty and shaking 2 hrs PP, this occured prior to pregnancy and avoid sugar now in am  How do you treat low blood sugar?: eats sugar  Can you tell when your blood sugar is too high?: no    Exercise   Exercise Type:  (participating in  yoga prior to pregnancy)  Frequency: Never    Current Diabetes Treatment   Current Treatment: Diet    Social History  Preferred Learning Method: Face to Face  Primary Support: Self  Educational Level: College Graduate  Occupation: [employer]  Smoking Status: Never a Smoker  Alcohol Use: Monthly (prior to pregnancy)    Barriers to Change  Barriers to Change: None  Learning Challenges : None         Cultural Influences  Cultural Influences: Yes  If yes, please list:: follows an herbal chinese belief,  would perfer to avoid meds    Diabetes Education Assessment/Progress  Diabetes Disease Process (diabetes disease process and treatment options): Discussion, Instructed, Needs Instruction, Individual Session, Written Materials Provided (Ed on IR durning pregnancy, Difference b/t Type 2, Type 1 and GDM)    Nutrition (Incorporating nutritional management into one's lifestyle): Discussion, Demonstration, Return Demonstration, Instructed, Needs Instruction, Individual Session, Written Materials Provided (Has followed a healthy balanced diet since her last pregnancy. Ed on carb counting, label reading and meal and snack planning. )    Physical Activity (incorporating physical activity into one's lifestyle): Discussion, Instructed, Individual Session, Written Materials Provided, Needs Instruction (Regularly participated in Yoga prior to pregnancy. Ed on benefits and precautions on exercising during pregnancy. Ed on ADA recs for exercise during pregnancy)    Medications (states correct name, dose, onset, peak, duration, side effects & timing of meds): Discussion, Instructed, Needs Instruction, Individual Session, Written Materials Provided (Diet controlled at this time. Took diabetes pills to control BG during last preg, she does not remember the name. Ed on medication options to control BG during pregancy)    Monitoring (monitoring blood glucose/other parameters & using results): Discussion, Demonstration, Return Demonstration, Instructed, Needs Instruction, Individual Session, Written Materials Provided (Provided & trained on One Touch Verio meter. BG was 85 PP breakfast. Pt demonstrated ability to SMBG. ED on BG ranges, sharps dispose, stressed bring logs to all OBGYN apmnts. Logs provided)    Acute Complications (preventing, detecting, and treating acute complications): Discussion, Demonstration, Instructed, Needs Instruction, Individual Session, Written Materials Provided (Pt reports feeling hypoglycemic PP eating  sugar prior to pregnancy. Avoids sugar. Denies any recent hypo or hyper events during pregnancy. Ed on causes, s/s and Tx for hypo and hyperglycemia.)    Chronic Complications (preventing, detecting, and treating chronic complications): Discussion, Instructed, Needs Instruction, Individual Session, Written Materials Provided (Ed on tight BG control to limit/prevent complications during and after pregnancy. Ed on Type 2 Dm prevention)    Clinical (diabetes, other pertinent medical history, and relevant comorbidities reviewed during visit): Discussion, Instructed, Needs Instruction, Individual Session, Written Materials Provided (Reviewed GTT results vs goal ranges)    Cognitive (knowledge of self-management skills, functional health literacy): Discussion, Needs Review, Instructed, Comprehends Key Points, Individual Session, Written Materials Provided    Psychosocial (emotional response to diabetes): Discussion, Individual Session, Comprehends Key Points, Written Materials Provided (Patient is very accepting of GDM diagnosis and very motivated to have a healthy pregnancy and baby)    Diabetes Distress and Support Systems: Discussion, Comprehends Key Points, Individual Session, Written Materials Provided (Pt has a very supportive spouse and family)    Behavioral (readiness for change, lifestyle practices, self-care behaviors): Discussion, Needs Review, Individual Session, Instructed, Written Materials Provided (Pt is very motivated to make all necessary changes)    Goals  Patient has selected/evaluated goals during today's session: Yes, selected  Healthy Eating: Set (Limit carbs to 30-45g at breakfast and 45-60g at lunch and dinner)  Start Date: 03/19/18  Target Date: 03/28/18  Monitoring: Set (SMBG 4 times daily and bring log to all OB apmnts)  Start Date: 03/19/18  Target Date: 03/28/18         Diabetes Care Plan/Intervention  Education Plan/Intervention: Other (f/u with OBGYN, bring logs to all apmtns)    Diabetes  Meal Plan  Restrictions: Restricted Carbohydrate  Calories: 2200  Carbohydrate Per Meal: 30-45g, 45-60g  Carbohydrate Per Snack : 15-30g  Fat: 72-80g  Protein: 72-81g    Education Units of Time   Time Spent: 60 min    Health Maintenance was reviewed today with patient. Discussed with patient importance of routine eye exams, foot exams/foot care, blood work (i.e.: A1c, microalbumin, and lipid), dental visits, yearly flu vaccine, and pneumonia vaccine as indicated by PCP. Patient verbalized understanding.     Health Maintenance Topics with due status: Not Due       Topic Last Completion Date    Pap Smear with HPV Cotest 11/01/2017     Health Maintenance Due   Topic Date Due    TETANUS VACCINE  08/01/1997

## 2018-03-19 NOTE — TELEPHONE ENCOUNTER
Pt needs script for needles and test strips for Onetouch verioflex glucometer sent to pharm on file.

## 2018-03-21 ENCOUNTER — TELEPHONE (OUTPATIENT)
Dept: OBSTETRICS AND GYNECOLOGY | Facility: CLINIC | Age: 38
End: 2018-03-21

## 2018-03-21 NOTE — TELEPHONE ENCOUNTER
Rosalinda pt's pharm is calling states that the testing streps that were sent in are not covered under pt's plan and needs alternative and paper is in fold. Also the pt called to say the same thing. Will be out tomorrow. Pt # [phone]

## 2018-03-21 NOTE — TELEPHONE ENCOUNTER
Spoke to the pharmacy and the pt and got her another glucometer and strips that her insurance covered. Pt is aware.

## 2018-03-27 NOTE — PROGRESS NOTES
29w1d without major complaints.   Patient dx'd with gDM at last visit. Has been to diabetic education.   Review of blood glucose logs shows  fasting values and  post prandial values. For both fasting and post prandial >50% are below target range.   Tdap given today.  RTC in 2 weeks for routine PNC.

## 2018-03-28 ENCOUNTER — CLINICAL SUPPORT (OUTPATIENT)
Dept: OBSTETRICS AND GYNECOLOGY | Facility: CLINIC | Age: 38
End: 2018-03-28
Payer: COMMERCIAL

## 2018-03-28 ENCOUNTER — ROUTINE PRENATAL (OUTPATIENT)
Dept: OBSTETRICS AND GYNECOLOGY | Facility: CLINIC | Age: 38
End: 2018-03-28
Payer: COMMERCIAL

## 2018-03-28 VITALS — SYSTOLIC BLOOD PRESSURE: 110 MMHG | BODY MASS INDEX: 30 KG/M2 | DIASTOLIC BLOOD PRESSURE: 62 MMHG | WEIGHT: 185.88 LBS

## 2018-03-28 DIAGNOSIS — Z23 NEED FOR TDAP VACCINATION: Primary | ICD-10-CM

## 2018-03-28 DIAGNOSIS — O24.419 GESTATIONAL DIABETES MELLITUS (GDM) IN SECOND TRIMESTER, GESTATIONAL DIABETES METHOD OF CONTROL UNSPECIFIED: Primary | ICD-10-CM

## 2018-03-28 DIAGNOSIS — O09.522 ELDERLY MULTIGRAVIDA IN SECOND TRIMESTER: ICD-10-CM

## 2018-03-28 DIAGNOSIS — Z3A.29 29 WEEKS GESTATION OF PREGNANCY: ICD-10-CM

## 2018-03-28 PROCEDURE — 0502F SUBSEQUENT PRENATAL CARE: CPT | Mod: S$GLB,,, | Performed by: OBSTETRICS & GYNECOLOGY

## 2018-03-28 PROCEDURE — 99999 PR PBB SHADOW E&M-EST. PATIENT-LVL I: CPT | Mod: PBBFAC,,,

## 2018-03-28 PROCEDURE — 90471 IMMUNIZATION ADMIN: CPT | Mod: S$GLB,,, | Performed by: OBSTETRICS & GYNECOLOGY

## 2018-03-28 PROCEDURE — 90715 TDAP VACCINE 7 YRS/> IM: CPT | Mod: S$GLB,,, | Performed by: OBSTETRICS & GYNECOLOGY

## 2018-03-28 PROCEDURE — 99999 PR PBB SHADOW E&M-EST. PATIENT-LVL II: CPT | Mod: PBBFAC,,, | Performed by: OBSTETRICS & GYNECOLOGY

## 2018-03-28 NOTE — PROGRESS NOTES
Ordering Provider: Dr. Garber    During visit today patient received an injection of Tdap to left deltoid.  Tolerated well.  Instructed pt to remain 15 minutes after injection to monitor for reactions.      Pre pain scale: none    Post pain scale: none

## 2018-04-09 ENCOUNTER — ROUTINE PRENATAL (OUTPATIENT)
Dept: OBSTETRICS AND GYNECOLOGY | Facility: CLINIC | Age: 38
End: 2018-04-09
Payer: COMMERCIAL

## 2018-04-09 VITALS
SYSTOLIC BLOOD PRESSURE: 124 MMHG | DIASTOLIC BLOOD PRESSURE: 74 MMHG | WEIGHT: 188.38 LBS | BODY MASS INDEX: 30.41 KG/M2

## 2018-04-09 DIAGNOSIS — O09.522 ELDERLY MULTIGRAVIDA IN SECOND TRIMESTER: ICD-10-CM

## 2018-04-09 DIAGNOSIS — Z3A.30 30 WEEKS GESTATION OF PREGNANCY: ICD-10-CM

## 2018-04-09 DIAGNOSIS — O24.419 GESTATIONAL DIABETES MELLITUS (GDM) IN SECOND TRIMESTER, GESTATIONAL DIABETES METHOD OF CONTROL UNSPECIFIED: Primary | ICD-10-CM

## 2018-04-09 PROCEDURE — 99999 PR PBB SHADOW E&M-EST. PATIENT-LVL II: CPT | Mod: PBBFAC,,, | Performed by: OBSTETRICS & GYNECOLOGY

## 2018-04-09 PROCEDURE — 0502F SUBSEQUENT PRENATAL CARE: CPT | Mod: S$GLB,,, | Performed by: OBSTETRICS & GYNECOLOGY

## 2018-04-09 NOTE — PROGRESS NOTES
30w6d without major complaints.   Blood glucose values both fasting and post prandial remain in recommended ranges >50% of the time with diet only.   Plan to RTC in 2 weeks for visit and U/S.

## 2018-04-25 ENCOUNTER — PROCEDURE VISIT (OUTPATIENT)
Dept: OBSTETRICS AND GYNECOLOGY | Facility: CLINIC | Age: 38
End: 2018-04-25
Payer: COMMERCIAL

## 2018-04-25 ENCOUNTER — ROUTINE PRENATAL (OUTPATIENT)
Dept: OBSTETRICS AND GYNECOLOGY | Facility: CLINIC | Age: 38
End: 2018-04-25
Payer: COMMERCIAL

## 2018-04-25 VITALS
DIASTOLIC BLOOD PRESSURE: 74 MMHG | WEIGHT: 188.63 LBS | SYSTOLIC BLOOD PRESSURE: 122 MMHG | BODY MASS INDEX: 30.44 KG/M2

## 2018-04-25 DIAGNOSIS — O09.523 ELDERLY MULTIGRAVIDA IN THIRD TRIMESTER: ICD-10-CM

## 2018-04-25 DIAGNOSIS — O35.9XX0 SUSPECTED FETAL ANOMALY, ANTEPARTUM, SINGLE OR UNSPECIFIED FETUS: ICD-10-CM

## 2018-04-25 DIAGNOSIS — R06.00 DYSPNEA, UNSPECIFIED TYPE: ICD-10-CM

## 2018-04-25 DIAGNOSIS — Z3A.30 30 WEEKS GESTATION OF PREGNANCY: ICD-10-CM

## 2018-04-25 DIAGNOSIS — O35.9XX0 FETAL ABNORMALITY SUSPECTED, ANTEPARTUM CONDITION, NOT APPLICABLE OR UNSPECIFIED FETUS: ICD-10-CM

## 2018-04-25 DIAGNOSIS — O24.410 DIET CONTROLLED GESTATIONAL DIABETES MELLITUS (GDM) IN THIRD TRIMESTER: Primary | ICD-10-CM

## 2018-04-25 DIAGNOSIS — Z3A.33 33 WEEKS GESTATION OF PREGNANCY: ICD-10-CM

## 2018-04-25 DIAGNOSIS — O24.419 GESTATIONAL DIABETES MELLITUS (GDM) IN THIRD TRIMESTER, GESTATIONAL DIABETES METHOD OF CONTROL UNSPECIFIED: ICD-10-CM

## 2018-04-25 PROCEDURE — 76819 FETAL BIOPHYS PROFIL W/O NST: CPT | Mod: S$GLB,,, | Performed by: OBSTETRICS & GYNECOLOGY

## 2018-04-25 PROCEDURE — 99999 PR PBB SHADOW E&M-EST. PATIENT-LVL II: CPT | Mod: PBBFAC,,, | Performed by: OBSTETRICS & GYNECOLOGY

## 2018-04-25 PROCEDURE — 76816 OB US FOLLOW-UP PER FETUS: CPT | Mod: S$GLB,,, | Performed by: OBSTETRICS & GYNECOLOGY

## 2018-04-25 PROCEDURE — 0502F SUBSEQUENT PRENATAL CARE: CPT | Mod: S$GLB,,, | Performed by: OBSTETRICS & GYNECOLOGY

## 2018-04-30 ENCOUNTER — CLINICAL SUPPORT (OUTPATIENT)
Dept: CARDIOLOGY | Facility: CLINIC | Age: 38
End: 2018-04-30
Attending: OBSTETRICS & GYNECOLOGY
Payer: COMMERCIAL

## 2018-04-30 ENCOUNTER — TELEPHONE (OUTPATIENT)
Dept: PEDIATRIC CARDIOLOGY | Facility: CLINIC | Age: 38
End: 2018-04-30

## 2018-04-30 ENCOUNTER — PROCEDURE VISIT (OUTPATIENT)
Dept: OBSTETRICS AND GYNECOLOGY | Facility: CLINIC | Age: 38
End: 2018-04-30
Payer: COMMERCIAL

## 2018-04-30 DIAGNOSIS — O24.410 DIET CONTROLLED GESTATIONAL DIABETES MELLITUS (GDM) IN THIRD TRIMESTER: ICD-10-CM

## 2018-04-30 DIAGNOSIS — R06.00 DYSPNEA, UNSPECIFIED TYPE: ICD-10-CM

## 2018-04-30 LAB
DIASTOLIC DYSFUNCTION: NO
ESTIMATED PA SYSTOLIC PRESSURE: 25.85
MITRAL VALVE REGURGITATION: NORMAL
RETIRED EF AND QEF - SEE NOTES: 60 (ref 55–65)
TRICUSPID VALVE REGURGITATION: NORMAL

## 2018-04-30 PROCEDURE — 76819 FETAL BIOPHYS PROFIL W/O NST: CPT | Mod: S$GLB,,, | Performed by: OBSTETRICS & GYNECOLOGY

## 2018-04-30 PROCEDURE — 93306 TTE W/DOPPLER COMPLETE: CPT | Mod: S$GLB,,, | Performed by: INTERNAL MEDICINE

## 2018-04-30 NOTE — TELEPHONE ENCOUNTER
Spoke with patient via telephone. Fetal echo scheduled for 5/4/18 @ 9 am at [facility]. Office address and number verified for further questions/concerns.

## 2018-05-02 ENCOUNTER — TELEPHONE (OUTPATIENT)
Dept: OBSTETRICS AND GYNECOLOGY | Facility: CLINIC | Age: 38
End: 2018-05-02

## 2018-05-02 NOTE — TELEPHONE ENCOUNTER
Pt was calling to speak to Noel, she would like to know if she still needs to have a fetal echo test done.

## 2018-05-02 NOTE — TELEPHONE ENCOUNTER
Spoke to pt letting her know that the providers at [facility] recommends she has a fetal echo done. Pt voiced understanding.

## 2018-05-04 ENCOUNTER — OFFICE VISIT (OUTPATIENT)
Dept: PEDIATRIC CARDIOLOGY | Facility: CLINIC | Age: 38
End: 2018-05-04
Payer: COMMERCIAL

## 2018-05-04 ENCOUNTER — CLINICAL SUPPORT (OUTPATIENT)
Dept: PEDIATRIC CARDIOLOGY | Facility: CLINIC | Age: 38
End: 2018-05-04
Payer: COMMERCIAL

## 2018-05-04 VITALS
HEIGHT: 66 IN | SYSTOLIC BLOOD PRESSURE: 111 MMHG | BODY MASS INDEX: 30.72 KG/M2 | WEIGHT: 191.13 LBS | DIASTOLIC BLOOD PRESSURE: 59 MMHG | HEART RATE: 96 BPM

## 2018-05-04 DIAGNOSIS — O35.9XX0 FETAL ABNORMALITY SUSPECTED, ANTEPARTUM CONDITION, NOT APPLICABLE OR UNSPECIFIED FETUS: ICD-10-CM

## 2018-05-04 DIAGNOSIS — O35.BXX0 PREGNANCY COMPLICATED BY FETAL CONGENITAL HEART DISEASE, SINGLE OR UNSPECIFIED FETUS: Primary | ICD-10-CM

## 2018-05-04 PROCEDURE — 76825 ECHO EXAM OF FETAL HEART: CPT | Mod: S$GLB,,, | Performed by: PEDIATRICS

## 2018-05-04 PROCEDURE — 99203 OFFICE O/P NEW LOW 30 MIN: CPT | Mod: 25,S$GLB,, | Performed by: PEDIATRICS

## 2018-05-04 PROCEDURE — 76827 ECHO EXAM OF FETAL HEART: CPT | Mod: S$GLB,,, | Performed by: PEDIATRICS

## 2018-05-04 PROCEDURE — 93325 DOPPLER ECHO COLOR FLOW MAPG: CPT | Mod: S$GLB,,, | Performed by: PEDIATRICS

## 2018-05-04 PROCEDURE — 99999 PR PBB SHADOW E&M-EST. PATIENT-LVL III: CPT | Mod: PBBFAC,,, | Performed by: PEDIATRICS

## 2018-05-06 NOTE — PROGRESS NOTES
[facility]    Today, I had the pleasure of evaluating Sarbjit Boateng who is now 37 y.o. and carrying her fourth pregnancy at 34 3/7 weeks gestation with an ANITA of 18. She was referred for evaluation of the fetal heart due right/left heart size discrepancy.      She is carrying a female  fetus, named La.      Obstetric History:    .  She has had two miscarriages.  Her OB history is otherwise unremarkable. Her OB care is by Dr. Garber and her MFM care by Dr. Leone.        Past Medical History:   Diagnosis Date    Anxiety     Depression     Gestational diabetes          Current Outpatient Prescriptions:     blood sugar diagnostic (BLOOD GLUCOSE TEST) Strp, 1 strip by Misc.(Non-Drug; Combo Route) route 4 (four) times daily., Disp: 100 strip, Rfl: 4    doxylamine succinate (UNISOM, DOXYLAMINE,) 25 mg tablet, Take 25 mg by mouth nightly as needed for Insomnia., Disp: , Rfl:     lancets (ONETOUCH DELICA LANCETS) 33 gauge Misc, 1 lancet by Misc.(Non-Drug; Combo Route) route 4 (four) times daily., Disp: 100 each, Rfl: 4    loratadine (CLARITIN) 10 mg tablet, Take 10 mg by mouth once daily., Disp: , Rfl:     ondansetron (ZOFRAN-ODT) 4 MG TbDL, Take 1 tablet (4 mg total) by mouth every 6 (six) hours as needed (nausea)., Disp: 30 tablet, Rfl: 1    prenatal vit 10-iron-folic-dha 65-1-250 mg Cmpk, Take 2 tablets by mouth once daily., Disp: , Rfl:     Family History: Negative for congenital heart disease, early coronary artery disease, sudden unexplained death, connective tissues disorders, genetic syndromes, multiple miscarriages or other congenital anomalies.    Social History: Mrs. Boateng is  to the father of the baby.      FETAL ECHOCARDIOGRAM (summary):  Fetal echocardiogram at 34 3/7 weeks gestation for a history of right/left heart discrepancy.  ANITA 18.  Normally connected heart.  The aortic arch measures normal.  Descending aorta velocity is  mildly increased.  The LV long axis Z score is -1.5. There is more of a discrepancy between the right and left hearts than is normally seen.  Bidirectional flow across the foramen ovale.  These findings are concerning for a possible coarctation of the aorta with PDA closure.  Normal fetal ductal level shunting.  No ventricular level shunting.  Normal atrioventricular and semilunar valve structure and function.  Normal ductal arch.  Normal biventricular systolic function.  Page 1 of 3  5/5/2018  No pericardial effusion.  (Full report in electronic medical record)    Impression:  Single active female fetus at 34 wga.  I agree that there is right and left heart discrepancy.  There are other signs that this fetus is a set up for coarctation of the aorta, including bidirectional flow at the atrial septum and increased velocity in the descending aorta.  Oddly, the arch measures normal, including the aortic isthmus.  I think this baby's heart deserves close watching after birth.  Given the normal size of the aorta, I think we can have mom deliver ad marlena and have the baby room in with her in the mother-baby unit.  We should get an echocardiogram within the next 12 hours to assess the arch and PDA.  If things looks worrisome, I told Mrs. Boateng that we would want to further monitor the baby in the NICU, but that if the arch looked ok, we could just re-echo before discharge.    Todays fetal echocardiogram is otherwise normal, within the limitations of fetal echocardiography.  I discussed with her that fetal echocardiography is insufficiently sensitive to rule out all septal defects, anomalies of pulmonary and systemic veins, arch anomalies, and some valvar abnormalities, nor can it ensure that the ductus arteriosus and foramen ovale will spontaneously close.     Recommendations:  Location, timing, and mode of delivery will be determined by the obstetrical team.  She does not require further follow-up in the fetal  echocardiography clinic, but I would be happy to see her again if additional questions or concerns arise.    1. Prenatal cardiac diagnosis: right/left heart size discrepancy and concern for coarctation of the aorta  2. Any prenatal genetic diagnoses or other major associated abnormalities, conditions: none  3. Primary Fetal Cardiologist: Colleen  Prenatal Recommendations:   1. Prenatal Cath MD Consult: No   2. Prenatal Congenital Heart Surgery Consult ? No   3. Follow up fetal cardiac evaluation not needed  Delivery and post jarrod recommendations as of last fetal visit:  1. Recommend delivery at tertiary care center such as [facility] where Pediatric Cardiology and Congenital Heart Surgery care are immediately available  2. From fetal cardiac perspective, ok for vaginal delivery  3. Recommend weekday delivery during daytime hours so that all needed caretakers are immediately available, if possible  4. PGE not necessary unless post-jarrod echocardiogram is concerning  5. Likely need for urgent intervention within first hours after delivery: No   6. Cardiology Consultant recommended to attend delivery: No   7. Transition to nursery  8. Cardiology consult: Yes  9.  surgical or cath intervention: likely no      The above information was discussed in detail including the use of diagrams, 60 minutes were used for the evaluation with half of that time in discussion and counseling.    Ronald Macias MD, MPH  Pediatric and Fetal Cardiology  [facility]  [address] [place] [address]    Office: [phone]  Pager: [phone]

## 2018-05-07 ENCOUNTER — PROCEDURE VISIT (OUTPATIENT)
Dept: OBSTETRICS AND GYNECOLOGY | Facility: CLINIC | Age: 38
End: 2018-05-07
Payer: COMMERCIAL

## 2018-05-07 ENCOUNTER — ROUTINE PRENATAL (OUTPATIENT)
Dept: OBSTETRICS AND GYNECOLOGY | Facility: CLINIC | Age: 38
End: 2018-05-07
Payer: COMMERCIAL

## 2018-05-07 VITALS
DIASTOLIC BLOOD PRESSURE: 72 MMHG | SYSTOLIC BLOOD PRESSURE: 116 MMHG | BODY MASS INDEX: 30.58 KG/M2 | WEIGHT: 189.38 LBS

## 2018-05-07 DIAGNOSIS — O24.410 DIET CONTROLLED GESTATIONAL DIABETES MELLITUS (GDM) IN THIRD TRIMESTER: ICD-10-CM

## 2018-05-07 DIAGNOSIS — O35.9XX0 SUSPECTED FETAL ANOMALY, ANTEPARTUM, SINGLE OR UNSPECIFIED FETUS: ICD-10-CM

## 2018-05-07 DIAGNOSIS — O09.523 ELDERLY MULTIGRAVIDA IN THIRD TRIMESTER: ICD-10-CM

## 2018-05-07 DIAGNOSIS — Z3A.34 34 WEEKS GESTATION OF PREGNANCY: ICD-10-CM

## 2018-05-07 DIAGNOSIS — O24.410 DIET CONTROLLED GESTATIONAL DIABETES MELLITUS (GDM) IN THIRD TRIMESTER: Primary | ICD-10-CM

## 2018-05-07 PROCEDURE — 76819 FETAL BIOPHYS PROFIL W/O NST: CPT | Mod: S$GLB,,, | Performed by: OBSTETRICS & GYNECOLOGY

## 2018-05-07 PROCEDURE — 99999 PR PBB SHADOW E&M-EST. PATIENT-LVL I: CPT | Mod: PBBFAC,,, | Performed by: OBSTETRICS & GYNECOLOGY

## 2018-05-07 PROCEDURE — 0502F SUBSEQUENT PRENATAL CARE: CPT | Mod: S$GLB,,, | Performed by: OBSTETRICS & GYNECOLOGY

## 2018-05-07 RX ORDER — FLUTICASONE PROPIONATE 50 MCG
1 SPRAY, SUSPENSION (ML) NASAL DAILY
COMMUNITY

## 2018-05-07 NOTE — PROGRESS NOTES
34w6d without major complaints.   Fetal echo shows some discrepancy between sides of ventricles, and coarctation of the aorta has not been ruled out. No further antepartum visits to peds cards necessary, but peds cards did recommend trying to delivery during a weekday with immediate cards consult. No changes to delivery protocol necessary.   BPP today 8/8.   RTC in 1 week for routine PNC.  Plan for IOL on 6/11 to deliver on 6/12. Will scheduled next week after cervical exam.

## 2018-05-14 ENCOUNTER — PROCEDURE VISIT (OUTPATIENT)
Dept: OBSTETRICS AND GYNECOLOGY | Facility: CLINIC | Age: 38
End: 2018-05-14
Payer: COMMERCIAL

## 2018-05-14 ENCOUNTER — TELEPHONE (OUTPATIENT)
Dept: OBSTETRICS AND GYNECOLOGY | Facility: CLINIC | Age: 38
End: 2018-05-14

## 2018-05-14 ENCOUNTER — LAB VISIT (OUTPATIENT)
Dept: LAB | Facility: HOSPITAL | Age: 38
End: 2018-05-14
Attending: OBSTETRICS & GYNECOLOGY
Payer: COMMERCIAL

## 2018-05-14 ENCOUNTER — ROUTINE PRENATAL (OUTPATIENT)
Dept: OBSTETRICS AND GYNECOLOGY | Facility: CLINIC | Age: 38
End: 2018-05-14
Payer: COMMERCIAL

## 2018-05-14 VITALS
BODY MASS INDEX: 30.83 KG/M2 | DIASTOLIC BLOOD PRESSURE: 74 MMHG | SYSTOLIC BLOOD PRESSURE: 114 MMHG | WEIGHT: 190.94 LBS

## 2018-05-14 DIAGNOSIS — O09.523 ELDERLY MULTIGRAVIDA IN THIRD TRIMESTER: ICD-10-CM

## 2018-05-14 DIAGNOSIS — O24.410 DIET CONTROLLED GESTATIONAL DIABETES MELLITUS (GDM) IN THIRD TRIMESTER: Primary | ICD-10-CM

## 2018-05-14 DIAGNOSIS — O24.410 DIET CONTROLLED GESTATIONAL DIABETES MELLITUS (GDM) IN THIRD TRIMESTER: ICD-10-CM

## 2018-05-14 DIAGNOSIS — Z3A.35 35 WEEKS GESTATION OF PREGNANCY: ICD-10-CM

## 2018-05-14 PROCEDURE — 87081 CULTURE SCREEN ONLY: CPT

## 2018-05-14 PROCEDURE — 99999 PR PBB SHADOW E&M-EST. PATIENT-LVL II: CPT | Mod: PBBFAC,,, | Performed by: OBSTETRICS & GYNECOLOGY

## 2018-05-14 PROCEDURE — 76819 FETAL BIOPHYS PROFIL W/O NST: CPT | Mod: S$GLB,,, | Performed by: OBSTETRICS & GYNECOLOGY

## 2018-05-14 PROCEDURE — 86703 HIV-1/HIV-2 1 RESULT ANTBDY: CPT

## 2018-05-14 PROCEDURE — 86592 SYPHILIS TEST NON-TREP QUAL: CPT

## 2018-05-14 PROCEDURE — 0502F SUBSEQUENT PRENATAL CARE: CPT | Mod: S$GLB,,, | Performed by: OBSTETRICS & GYNECOLOGY

## 2018-05-14 PROCEDURE — 76816 OB US FOLLOW-UP PER FETUS: CPT | Mod: S$GLB,,, | Performed by: OBSTETRICS & GYNECOLOGY

## 2018-05-14 NOTE — TELEPHONE ENCOUNTER
----- Message from Giulia Garber MD sent at 2018  4:17 PM CDT -----  Marisel Lai, can you please schedule a cytotec induction for my patient Sarbjit on  to delivery on     Indication: gDM, AMA    Date and time:  PM, any time.     Cervical Exam: /3        Pt will be 39w6d with an EDC on .    TORRES SCORE: 4

## 2018-05-14 NOTE — PROGRESS NOTES
35w6d without major complaints.   GBS collected, HIV, RPR drawn.   GBS 8/8.   Labor precautions discussed.   IOL scheduled for 6/11/18.   RTC in 1 week for routine PNC.

## 2018-05-15 LAB
HIV 1+2 AB+HIV1 P24 AG SERPL QL IA: NEGATIVE
RPR SER QL: NORMAL

## 2018-05-15 NOTE — PROGRESS NOTES
36w6d without major complaints.   Labor precautions reviewed.   Consents for delivery and transfusion signed today.   PPC discussed and patient would like Nexplanon.  BPP 8/8.   RTC in 1 week for routine PNC.

## 2018-05-17 LAB — BACTERIA SPEC AEROBE CULT: NORMAL

## 2018-05-21 ENCOUNTER — TELEPHONE (OUTPATIENT)
Dept: OBSTETRICS AND GYNECOLOGY | Facility: CLINIC | Age: 38
End: 2018-05-21

## 2018-05-21 ENCOUNTER — PROCEDURE VISIT (OUTPATIENT)
Dept: OBSTETRICS AND GYNECOLOGY | Facility: CLINIC | Age: 38
End: 2018-05-21
Payer: COMMERCIAL

## 2018-05-21 ENCOUNTER — ROUTINE PRENATAL (OUTPATIENT)
Dept: OBSTETRICS AND GYNECOLOGY | Facility: CLINIC | Age: 38
End: 2018-05-21
Payer: COMMERCIAL

## 2018-05-21 VITALS
BODY MASS INDEX: 30.87 KG/M2 | SYSTOLIC BLOOD PRESSURE: 118 MMHG | WEIGHT: 191.13 LBS | DIASTOLIC BLOOD PRESSURE: 76 MMHG

## 2018-05-21 DIAGNOSIS — O09.523 ELDERLY MULTIGRAVIDA IN THIRD TRIMESTER: ICD-10-CM

## 2018-05-21 DIAGNOSIS — Z3A.36 36 WEEKS GESTATION OF PREGNANCY: ICD-10-CM

## 2018-05-21 DIAGNOSIS — O24.410 DIET CONTROLLED GESTATIONAL DIABETES MELLITUS (GDM) IN THIRD TRIMESTER: ICD-10-CM

## 2018-05-21 DIAGNOSIS — O35.9XX0 SUSPECTED FETAL ANOMALY, ANTEPARTUM, SINGLE OR UNSPECIFIED FETUS: ICD-10-CM

## 2018-05-21 DIAGNOSIS — O24.410 DIET CONTROLLED GESTATIONAL DIABETES MELLITUS (GDM) IN THIRD TRIMESTER: Primary | ICD-10-CM

## 2018-05-21 PROCEDURE — 99999 PR PBB SHADOW E&M-EST. PATIENT-LVL III: CPT | Mod: PBBFAC,,, | Performed by: OBSTETRICS & GYNECOLOGY

## 2018-05-21 PROCEDURE — 0502F SUBSEQUENT PRENATAL CARE: CPT | Mod: S$GLB,,, | Performed by: OBSTETRICS & GYNECOLOGY

## 2018-05-21 PROCEDURE — 76819 FETAL BIOPHYS PROFIL W/O NST: CPT | Mod: S$GLB,,, | Performed by: OBSTETRICS & GYNECOLOGY

## 2018-05-21 NOTE — PROCEDURES
Procedures   Obstetrical ultrasound completed today.  See report in imaging section of [facility].

## 2018-05-21 NOTE — TELEPHONE ENCOUNTER
----- Message from Giulia Garber MD sent at 5/21/2018  9:42 AM CDT -----  Sarbjit Marshall would like a Nexplanon for her post partum contraception. Thank you!

## 2018-05-22 ENCOUNTER — TELEPHONE (OUTPATIENT)
Dept: OBSTETRICS AND GYNECOLOGY | Facility: CLINIC | Age: 38
End: 2018-05-22

## 2018-05-22 NOTE — TELEPHONE ENCOUNTER
37 week OB c/o nausea and dizziness.  Reports increased hydration so she is voiding more frequently which I reassured her is expected with more fluid intake.  Reassured her dizziness is common in pregnancy, recommended Claritin in case she has inner ear fluid contributing to the dizziness, rest and hydration.

## 2018-05-22 NOTE — TELEPHONE ENCOUNTER
Shirlene ob pt - pt is 37 weeks and started yesterday feeling nauseous and having lower back pain. She said she has been trying to drink some water but the nausea is not going away. Pt would like to know what she should do.

## 2018-05-22 NOTE — TELEPHONE ENCOUNTER
MD Joelle Becerra RN   Caller: Unspecified (Today, 11:40 AM)             If she's not feeling better by tomorrow please have her come see me.   Thank you     Pt advised, states she is feeling better, instructed her to call in AM if she starts feeling worse.

## 2018-05-28 ENCOUNTER — ROUTINE PRENATAL (OUTPATIENT)
Dept: OBSTETRICS AND GYNECOLOGY | Facility: CLINIC | Age: 38
End: 2018-05-28
Attending: STUDENT IN AN ORGANIZED HEALTH CARE EDUCATION/TRAINING PROGRAM
Payer: COMMERCIAL

## 2018-05-28 ENCOUNTER — HOSPITAL ENCOUNTER (EMERGENCY)
Facility: OTHER | Age: 38
Discharge: HOME | End: 2018-05-28
Attending: OBSTETRICS & GYNECOLOGY
Payer: COMMERCIAL

## 2018-05-28 ENCOUNTER — PROCEDURE VISIT (OUTPATIENT)
Dept: OBSTETRICS AND GYNECOLOGY | Facility: CLINIC | Age: 38
End: 2018-05-28
Payer: COMMERCIAL

## 2018-05-28 VITALS
BODY MASS INDEX: 30.69 KG/M2 | SYSTOLIC BLOOD PRESSURE: 126 MMHG | DIASTOLIC BLOOD PRESSURE: 84 MMHG | WEIGHT: 190.06 LBS

## 2018-05-28 DIAGNOSIS — O24.410 DIET CONTROLLED GESTATIONAL DIABETES MELLITUS (GDM) IN THIRD TRIMESTER: Primary | ICD-10-CM

## 2018-05-28 DIAGNOSIS — O24.410 DIET CONTROLLED GESTATIONAL DIABETES MELLITUS IN THIRD TRIMESTER: Primary | ICD-10-CM

## 2018-05-28 DIAGNOSIS — Z3A.37 37 WEEKS GESTATION OF PREGNANCY: Primary | ICD-10-CM

## 2018-05-28 DIAGNOSIS — O24.410 DIET CONTROLLED GESTATIONAL DIABETES MELLITUS (GDM) IN THIRD TRIMESTER: ICD-10-CM

## 2018-05-28 DIAGNOSIS — Z92.89 H/O FETAL BIOPHYSICAL PROFILE: ICD-10-CM

## 2018-05-28 DIAGNOSIS — Z3A.38 38 WEEKS GESTATION OF PREGNANCY: ICD-10-CM

## 2018-05-28 DIAGNOSIS — O09.523 ELDERLY MULTIGRAVIDA IN THIRD TRIMESTER: ICD-10-CM

## 2018-05-28 PROCEDURE — 99283 EMERGENCY DEPT VISIT LOW MDM: CPT | Mod: 25

## 2018-05-28 PROCEDURE — 59025 FETAL NON-STRESS TEST: CPT

## 2018-05-28 PROCEDURE — 99999 PR PBB SHADOW E&M-EST. PATIENT-LVL II: CPT | Mod: PBBFAC,,, | Performed by: STUDENT IN AN ORGANIZED HEALTH CARE EDUCATION/TRAINING PROGRAM

## 2018-05-28 PROCEDURE — 99284 EMERGENCY DEPT VISIT MOD MDM: CPT | Mod: 25,,, | Performed by: OBSTETRICS & GYNECOLOGY

## 2018-05-28 PROCEDURE — 76818 FETAL BIOPHYS PROFILE W/NST: CPT | Mod: 26,,, | Performed by: OBSTETRICS & GYNECOLOGY

## 2018-05-28 PROCEDURE — 0502F SUBSEQUENT PRENATAL CARE: CPT | Mod: S$GLB,,, | Performed by: STUDENT IN AN ORGANIZED HEALTH CARE EDUCATION/TRAINING PROGRAM

## 2018-05-28 PROCEDURE — 76818 FETAL BIOPHYS PROFILE W/NST: CPT | Mod: S$GLB,,, | Performed by: OBSTETRICS & GYNECOLOGY

## 2018-05-28 NOTE — PROGRESS NOTES
Doing well.  Reports feeling some nausea over the weekend - improved today.  Reports fetal movement.  Denies contractions, leakage of fluid, vaginal bleeding.  BPP today 6/10 with reactive NST (-2 for tone, -2 for movement).  Accucheck 80.  Will send to OB ED for prolonged monitoring and repeat BPP.  All questions answered.

## 2018-05-29 NOTE — ED PROVIDER NOTES
"Encounter Date: 2018       History     Chief Complaint   Patient presents with    Other     prolonged monitoring and repeat BPP     Sarbjit Boateng is a 37 y.o. [id number] at 37w6d presents from clinic for prolonged monitoring and repeat BPP.  Patient was seen in clinic today and had BPP 6/10.  MFM discussed options and decided with primary ob/gyn to come to [place] for prolonged monitoring and repeat BPP.   Currently patient feels well. She reports irregular contractions, denies VB. She states she "lost her mucous plug" yesterday, has not had LOF. Reports good FM.    This IUP is complicated by GDM (diet controlled) and possible fetal coarctation of the aorta.            Review of patient's allergies indicates:  No Known Allergies  Past Medical History:   Diagnosis Date    Anxiety     Depression     Gestational diabetes      Past Surgical History:   Procedure Laterality Date    WISDOM TOOTH EXTRACTION       Family History   Problem Relation Age of Onset    Cancer Mother 53        breast cancer    Hypertension Mother     Hyperlipidemia Mother     Diabetes Mother     Breast cancer Mother     Cancer Father         colon cancer, in remission    Colon cancer Father     Cancer Maternal Uncle         tongue cancer    Tongue cancer Maternal Uncle     Cancer Maternal Grandmother         ovarian cancer    Ovarian cancer Maternal Grandmother     Cancer Maternal Grandfather         liver cancer    Liver cancer Maternal Grandfather     Cancer Paternal Grandfather         colon cancer    Colon cancer Paternal Grandfather     Arrhythmia Neg Hx     Cardiomyopathy Neg Hx     Congenital heart disease Neg Hx     Heart attacks under age 50 Neg Hx     Pacemaker/defibrilator Neg Hx      Social History   Substance Use Topics    Smoking status: Never Smoker    Smokeless tobacco: Never Used    Alcohol use 0.0 oz/week      Comment: occassional drinking, 3-4 in one sitting      Review of Systems   Constitutional: " Negative for chills, fatigue and fever.   HENT: Negative for congestion.    Eyes: Negative for visual disturbance.   Respiratory: Negative for cough and shortness of breath.    Cardiovascular: Negative for chest pain and palpitations.   Gastrointestinal: Negative for abdominal distention, abdominal pain, constipation, diarrhea, nausea and vomiting.   Genitourinary: Negative for difficulty urinating, dysuria, hematuria, vaginal bleeding and vaginal discharge.   Skin: Negative for rash.   Neurological: Negative for dizziness, seizures, light-headedness and headaches.   Hematological: Does not bruise/bleed easily.   Psychiatric/Behavioral: Negative for dysphoric mood. The patient is not nervous/anxious.        Physical Exam     Initial Vitals   BP Pulse Resp Temp SpO2   -- -- -- -- --      MAP       --         BP: (126)/(84) 126/84      Physical Exam    Constitutional: She appears well-developed and well-nourished. No distress.   HENT:   Head: Normocephalic and atraumatic.   Cardiovascular: Normal rate and regular rhythm.   Pulmonary/Chest: No respiratory distress.   Neurological: She is alert and oriented to person, place, and time.   Psychiatric: She has a normal mood and affect.     OB LABOR EXAM:         Vaginal Bleeding: none present.     Dilatation: 2.   Station: -3.   Effacement: 50%.   Amniotic Fluid Color: no fluid.           ED Course   Obtain Fetal nonstress test (NST)  Date/Time: 2018 7:34 PM  Performed by: ALISHA CORTEZ  Authorized by: ALISHA CORTEZ     Nonstress Test:     Variability:  6-25 BPM    Decelerations:  None    Accelerations:  15 bpm    Baseline:  135    Contractions:  Irregular  Biophysical Profile:     Fetal Breathin    Fetal Movement:  2    Fetal Tone:  2    Fluid Volume:  2    Nonstress Test:  2    Biophysical Profile Score  (of 8):  8    Biophysical Profile Score  (of 10):  10    MVP (Maximal Vertical Pocket):  7.8    Nonstress Test Interpretation: reactive       Overall Impression:  Reassuring  Post-procedure:     Patient tolerance:  Patient tolerated the procedure well with no immediate complications        Labs Reviewed - No data to display          Medical Decision Making:   ED Management:  NST reactive/reassuring  BPP 10/10  Cervix 2/50/-3, unchanged from clinic  Patient stable for discharge  Pt was given routine pregnancy instructions including to return to triage if she had any vaginal bleeding (other than spotting for the next 48hrs), any loss of fluid like her water broke, decreased fetal movement, or contractions Q 5min  lasting for 2 or more hours. Pt was also instructed to drink copious water. Patient voiced understanding of all theseinstructions and was subsequently discharged home.  RTC for next prenatal visit   Other:   I have discussed this case with another health care provider.              Attending Attestation:   Physician Attestation Statement for Resident:  As the supervising MD   Physician Attestation Statement: I have personally seen and examined this patient.   I agree with the above history. -:   As the supervising MD I agree with the above PE.    As the supervising MD I agree with the above treatment, course, plan, and disposition.   -:   NST  I independently reviewed the fetal non-stress test with the following interpretation:  135 BPM baseline  Variability: moderate  Accelerations: present  Decelerations: absent  Contractions: none  Category 1    Clinical Interpretation:reactive    Patient evaluated and found to be stable, agree with resident's assessment of reassuring fetal testing after a BPP in the office of 6/10 (done due to GDM), now 10/10 and plan to discharge to home with kick counts and routine monitoring.  I was personally present during the critical portions of the procedure(s) performed by the resident and was immediately available in the ED to provide services and assistance as needed during the entire procedure.  I have reviewed the  following: old records at this facility.                       Clinical Impression:   The primary encounter diagnosis was Diet controlled gestational diabetes mellitus (GDM) in third trimester. A diagnosis of Elderly multigravida in third trimester was also pertinent to this visit.                           Negrita Carson MD  Resident  05/29/18 0301       Lona Mejia MD  05/29/18 [id number]

## 2018-05-29 NOTE — DISCHARGE INSTRUCTIONS
The following signs may be a warning that you may need medical care.  · Severe headache not relieved by tylenol.  · Blurry vision or seeing spots.  · Sudden swelling in your face or hands.  · Sudden weight gain in only a few days.  · Severe stomach pains or cramps.  · Vomiting lasting more than 24 hours.  · Fever greater than 100.4 degrees.  · Vaginal bleeding that is more than just spotting.  · Excessive and unusual vaginal discharge.  · A gush or flow of watery fluid from your vagina.  · Decrease or absence of baby's movement (starting at 28 weeks).  ·  (less than 37 weeks) labor: more than 4 contractions in an hour for 2 hours.  · Term (greater than 37 weeks) labor: contractions every 5 minutes for 2 hours.

## 2018-06-04 ENCOUNTER — ROUTINE PRENATAL (OUTPATIENT)
Dept: OBSTETRICS AND GYNECOLOGY | Facility: CLINIC | Age: 38
End: 2018-06-04
Payer: COMMERCIAL

## 2018-06-04 ENCOUNTER — ANESTHESIA (OUTPATIENT)
Dept: OBSTETRICS AND GYNECOLOGY | Facility: OTHER | Age: 38
End: 2018-06-04
Payer: COMMERCIAL

## 2018-06-04 ENCOUNTER — HOSPITAL ENCOUNTER (INPATIENT)
Facility: OTHER | Age: 38
LOS: 3 days | Discharge: HOME | End: 2018-06-07
Attending: OBSTETRICS & GYNECOLOGY | Admitting: OBSTETRICS & GYNECOLOGY
Payer: COMMERCIAL

## 2018-06-04 ENCOUNTER — ANESTHESIA EVENT (OUTPATIENT)
Dept: OBSTETRICS AND GYNECOLOGY | Facility: OTHER | Age: 38
End: 2018-06-04
Payer: COMMERCIAL

## 2018-06-04 ENCOUNTER — PROCEDURE VISIT (OUTPATIENT)
Dept: OBSTETRICS AND GYNECOLOGY | Facility: CLINIC | Age: 38
End: 2018-06-04
Payer: COMMERCIAL

## 2018-06-04 VITALS — WEIGHT: 193.25 LBS | DIASTOLIC BLOOD PRESSURE: 70 MMHG | BODY MASS INDEX: 31.2 KG/M2 | SYSTOLIC BLOOD PRESSURE: 130 MMHG

## 2018-06-04 DIAGNOSIS — O28.9 ABNORMAL ANTENATAL TEST: ICD-10-CM

## 2018-06-04 DIAGNOSIS — O24.410 DIET CONTROLLED GESTATIONAL DIABETES MELLITUS (GDM) IN THIRD TRIMESTER: ICD-10-CM

## 2018-06-04 DIAGNOSIS — O09.523 ELDERLY MULTIGRAVIDA IN THIRD TRIMESTER: ICD-10-CM

## 2018-06-04 DIAGNOSIS — Z3A.38 38 WEEKS GESTATION OF PREGNANCY: ICD-10-CM

## 2018-06-04 DIAGNOSIS — O24.410 DIET CONTROLLED GESTATIONAL DIABETES MELLITUS IN THIRD TRIMESTER: Primary | ICD-10-CM

## 2018-06-04 LAB
ABO + RH BLD: NORMAL
BASOPHILS # BLD AUTO: 0.01 K/UL
BASOPHILS NFR BLD: 0.1 %
BLD GP AB SCN CELLS X3 SERPL QL: NORMAL
DIFFERENTIAL METHOD: ABNORMAL
EOSINOPHIL # BLD AUTO: 0.1 K/UL
EOSINOPHIL NFR BLD: 1.2 %
ERYTHROCYTE [DISTWIDTH] IN BLOOD BY AUTOMATED COUNT: 14.4 %
HCT VFR BLD AUTO: 31 %
HGB BLD-MCNC: 10.1 G/DL
LYMPHOCYTES # BLD AUTO: 1.6 K/UL
LYMPHOCYTES NFR BLD: 16.1 %
MCH RBC QN AUTO: 28.3 PG
MCHC RBC AUTO-ENTMCNC: 32.6 G/DL
MCV RBC AUTO: 87 FL
MONOCYTES # BLD AUTO: 0.7 K/UL
MONOCYTES NFR BLD: 6.7 %
NEUTROPHILS # BLD AUTO: 7.4 K/UL
NEUTROPHILS NFR BLD: 75.3 %
PLATELET # BLD AUTO: 236 K/UL
PMV BLD AUTO: 9.5 FL
POCT GLUCOSE: 115 MG/DL (ref 70–110)
RBC # BLD AUTO: 3.57 M/UL
WBC # BLD AUTO: 9.85 K/UL

## 2018-06-04 PROCEDURE — 0502F SUBSEQUENT PRENATAL CARE: CPT | Mod: S$GLB,,, | Performed by: OBSTETRICS & GYNECOLOGY

## 2018-06-04 PROCEDURE — 63600175 PHARM REV CODE 636 W HCPCS: Performed by: OBSTETRICS & GYNECOLOGY

## 2018-06-04 PROCEDURE — 27800517 HC TRAY,EPIDURAL-CONTINUOUS: Performed by: ANESTHESIOLOGY

## 2018-06-04 PROCEDURE — 86901 BLOOD TYPING SEROLOGIC RH(D): CPT

## 2018-06-04 PROCEDURE — 85025 COMPLETE CBC W/AUTO DIFF WBC: CPT

## 2018-06-04 PROCEDURE — 27200710 HC EPIDURAL INFUSION PUMP SET: Performed by: ANESTHESIOLOGY

## 2018-06-04 PROCEDURE — 11000001 HC ACUTE MED/SURG PRIVATE ROOM

## 2018-06-04 PROCEDURE — 76819 FETAL BIOPHYS PROFIL W/O NST: CPT | Mod: S$GLB,,, | Performed by: OBSTETRICS & GYNECOLOGY

## 2018-06-04 PROCEDURE — 99999 PR PBB SHADOW E&M-EST. PATIENT-LVL III: CPT | Mod: PBBFAC,,, | Performed by: OBSTETRICS & GYNECOLOGY

## 2018-06-04 PROCEDURE — 59400 OBSTETRICAL CARE: CPT | Mod: QY,,, | Performed by: ANESTHESIOLOGY

## 2018-06-04 PROCEDURE — 76816 OB US FOLLOW-UP PER FETUS: CPT | Mod: S$GLB,,, | Performed by: OBSTETRICS & GYNECOLOGY

## 2018-06-04 RX ORDER — FENTANYL/BUPIVACAINE/NS/PF 2MCG/ML-.1
PLASTIC BAG, INJECTION (ML) INJECTION
Status: DISPENSED
Start: 2018-06-04 | End: 2018-06-05

## 2018-06-04 RX ORDER — FAMOTIDINE 10 MG/ML
20 INJECTION INTRAVENOUS ONCE
Status: DISCONTINUED | OUTPATIENT
Start: 2018-06-05 | End: 2018-06-05

## 2018-06-04 RX ORDER — SODIUM CITRATE AND CITRIC ACID MONOHYDRATE 334; 500 MG/5ML; MG/5ML
30 SOLUTION ORAL ONCE
Status: DISCONTINUED | OUTPATIENT
Start: 2018-06-05 | End: 2018-06-05

## 2018-06-04 RX ORDER — SODIUM CHLORIDE, SODIUM LACTATE, POTASSIUM CHLORIDE, CALCIUM CHLORIDE 600; 310; 30; 20 MG/100ML; MG/100ML; MG/100ML; MG/100ML
INJECTION, SOLUTION INTRAVENOUS CONTINUOUS
Status: DISCONTINUED | OUTPATIENT
Start: 2018-06-04 | End: 2018-06-05

## 2018-06-04 RX ORDER — OXYTOCIN/RINGER'S LACTATE 20/1000 ML
2 PLASTIC BAG, INJECTION (ML) INTRAVENOUS CONTINUOUS
Status: DISCONTINUED | OUTPATIENT
Start: 2018-06-04 | End: 2018-06-05

## 2018-06-04 RX ORDER — SODIUM CHLORIDE 9 MG/ML
INJECTION, SOLUTION INTRAVENOUS
Status: DISCONTINUED | OUTPATIENT
Start: 2018-06-04 | End: 2018-06-05

## 2018-06-04 RX ORDER — FENTANYL/BUPIVACAINE/NS/PF 2MCG/ML-.1
PLASTIC BAG, INJECTION (ML) INJECTION CONTINUOUS
Status: DISCONTINUED | OUTPATIENT
Start: 2018-06-05 | End: 2018-06-05

## 2018-06-04 RX ORDER — ONDANSETRON 8 MG/1
8 TABLET, ORALLY DISINTEGRATING ORAL EVERY 8 HOURS PRN
Status: DISCONTINUED | OUTPATIENT
Start: 2018-06-04 | End: 2018-06-05

## 2018-06-04 RX ADMIN — Medication 2 MILLI-UNITS/MIN: at 10:06

## 2018-06-04 NOTE — H&P
HISTORY AND PHYSICAL                                                OBSTETRICS          Subjective:      Sarbjit Boateng is a 37 y.o.  female with IUP at 38w6d weeks gestation who presents to L&D for induction of labor secondary to abnormal  testing, BPP 6/8 for no breathing, for the second week in a row. Patient is undergoing  testing secondary to diet controlled gDM. Pertinent medical history for this pregnancy includes AMA with normal quad screening.  Patient reports normal fetal movement.  She denies contractions, vaginal bleeding and leakage of fluid.  Care this pregnancy has been with Dr. Garber    PMHx:   Past Medical History:   Diagnosis Date    Anxiety     Depression     Gestational diabetes        PSHx:   Past Surgical History:   Procedure Laterality Date    WISDOM TOOTH EXTRACTION         All: Review of patient's allergies indicates:  No Known Allergies    Meds:   No prescriptions prior to admission.       SH:   Social History     Social History    Marital status:      Spouse name: N/A    Number of children: N/A    Years of education: N/A     Occupational History    Not on file.     Social History Main Topics    Smoking status: Never Smoker    Smokeless tobacco: Never Used    Alcohol use 0.0 oz/week      Comment: occassional drinking, 3-4 in one sitting     Drug use: No    Sexual activity: Yes     Partners: Male     Birth control/ protection: None     Other Topics Concern    Not on file     Social History Narrative    No narrative on file       FH:   Family History   Problem Relation Age of Onset    Cancer Mother 53        breast cancer    Hypertension Mother     Hyperlipidemia Mother     Diabetes Mother     Breast cancer Mother     Cancer Father         colon cancer, in remission    Colon cancer Father     Cancer Maternal Uncle         tongue cancer    Tongue cancer Maternal Uncle     Cancer Maternal Grandmother         ovarian cancer     Ovarian cancer Maternal Grandmother     Cancer Maternal Grandfather         liver cancer    Liver cancer Maternal Grandfather     Cancer Paternal Grandfather         colon cancer    Colon cancer Paternal Grandfather     Arrhythmia Neg Hx     Cardiomyopathy Neg Hx     Congenital heart disease Neg Hx     Heart attacks under age 50 Neg Hx     Pacemaker/defibrilator Neg Hx        OBHx:   Obstetric History       T1      L1     SAB2   TAB0   Ectopic0   Multiple0   Live Births1       # Outcome Date GA Lbr [place]/2nd Weight Sex Delivery Anes PTL Lv   4 Current            3 2015 6w0d          2 Term  40w0d  3.487 kg (7 lb 11 oz) F Vag-Spont EPI  KENDALL      Name: Zeny   1 2008 11w0d                 Objective:      LMP 2017   There is no height or weight on file to calculate BMI.    General:   alert and cooperative   HEENT:  normocephalic, atraumatic   Lungs:   clear to auscultation bilaterally   Heart:   regular rate and rhythm, S1, S2 normal   Abdomen:  gravid, non-tender   Extremities non-tender, no edema   Derm: no rashes or lesions   Psych: appropriate mood and affect   Pelvis:  adequate       FHT: WNL on U/S       Cervix:     Dilation: 3 cm    Effacement: 50%    Station:  -3    Consistency: moderate    Position mid       Lab Review  Blood Type O POS  GBBS: negative   Rubella:   Lab Results   Component Value Date    RUBELLAIGGAN 33.0 (H) 10/16/2017    immune  RPR:   RPR   Date Value Ref Range Status   2018 Non-reactive Non-reactive Final      HIV:   Lab Results   Component Value Date    VYS24IUIZ Negative 2018     HepB:   Hepatitis B Surface Ag   Date Value Ref Range Status   10/16/2017 Negative  Final        Assessment:     37 y.o.  at 38w6d weeks gestation with abnormal  testing  2. gDM - diet controlled  3. AMA    Plan:     1. Risks, benefits, alternatives and possible complications have been discussed in detail with the patient. All questions have been  answered, and Ms. Boateng has voiced understanding and agrees to the treatment plan.  2. Consents signed and in chart  3. Admit to Labor and Delivery unit

## 2018-06-05 ENCOUNTER — TELEPHONE (OUTPATIENT)
Dept: OBSTETRICS AND GYNECOLOGY | Facility: CLINIC | Age: 38
End: 2018-06-05

## 2018-06-05 PROBLEM — O28.9 ABNORMAL ANTENATAL TEST: Status: RESOLVED | Noted: 2018-06-04 | Resolved: 2018-06-05

## 2018-06-05 PROBLEM — O28.9 ABNORMAL ANTENATAL TEST: Status: ACTIVE | Noted: 2018-06-05

## 2018-06-05 PROBLEM — O24.410 DIET CONTROLLED GESTATIONAL DIABETES MELLITUS (GDM), ANTEPARTUM: Status: ACTIVE | Noted: 2018-06-05

## 2018-06-05 LAB
BASOPHILS # BLD AUTO: 0.01 K/UL
BASOPHILS NFR BLD: 0.1 %
DIFFERENTIAL METHOD: ABNORMAL
EOSINOPHIL # BLD AUTO: 0.1 K/UL
EOSINOPHIL NFR BLD: 0.6 %
ERYTHROCYTE [DISTWIDTH] IN BLOOD BY AUTOMATED COUNT: 14.7 %
HCT VFR BLD AUTO: 23.4 %
HGB BLD-MCNC: 7.4 G/DL
LYMPHOCYTES # BLD AUTO: 1.2 K/UL
LYMPHOCYTES NFR BLD: 8.5 %
MCH RBC QN AUTO: 27.8 PG
MCHC RBC AUTO-ENTMCNC: 31.6 G/DL
MCV RBC AUTO: 88 FL
MONOCYTES # BLD AUTO: 0.9 K/UL
MONOCYTES NFR BLD: 6.3 %
NEUTROPHILS # BLD AUTO: 12 K/UL
NEUTROPHILS NFR BLD: 84.3 %
PLATELET # BLD AUTO: 196 K/UL
PMV BLD AUTO: 9.4 FL
RBC # BLD AUTO: 2.66 M/UL
WBC # BLD AUTO: 14.27 K/UL

## 2018-06-05 PROCEDURE — 72100002 HC LABOR CARE, 1ST 8 HOURS

## 2018-06-05 PROCEDURE — 51702 INSERT TEMP BLADDER CATH: CPT

## 2018-06-05 PROCEDURE — 72200005 HC VAGINAL DELIVERY LEVEL II

## 2018-06-05 PROCEDURE — 10907ZC DRAINAGE OF AMNIOTIC FLUID, THERAPEUTIC FROM PRODUCTS OF CONCEPTION, VIA NATURAL OR ARTIFICIAL OPENING: ICD-10-PCS | Performed by: OBSTETRICS & GYNECOLOGY

## 2018-06-05 PROCEDURE — 72100003 HC LABOR CARE, EA. ADDL. 8 HRS

## 2018-06-05 PROCEDURE — 62326 NJX INTERLAMINAR LMBR/SAC: CPT | Performed by: ANESTHESIOLOGY

## 2018-06-05 PROCEDURE — 25000003 PHARM REV CODE 250: Performed by: OBSTETRICS & GYNECOLOGY

## 2018-06-05 PROCEDURE — 11000001 HC ACUTE MED/SURG PRIVATE ROOM

## 2018-06-05 PROCEDURE — 85025 COMPLETE CBC W/AUTO DIFF WBC: CPT

## 2018-06-05 PROCEDURE — 0KQM0ZZ REPAIR PERINEUM MUSCLE, OPEN APPROACH: ICD-10-PCS | Performed by: OBSTETRICS & GYNECOLOGY

## 2018-06-05 PROCEDURE — 59400 OBSTETRICAL CARE: CPT | Mod: GB,,, | Performed by: OBSTETRICS & GYNECOLOGY

## 2018-06-05 PROCEDURE — 3E033VJ INTRODUCTION OF OTHER HORMONE INTO PERIPHERAL VEIN, PERCUTANEOUS APPROACH: ICD-10-PCS | Performed by: OBSTETRICS & GYNECOLOGY

## 2018-06-05 PROCEDURE — 25000003 PHARM REV CODE 250: Performed by: ANESTHESIOLOGY

## 2018-06-05 PROCEDURE — 36415 COLL VENOUS BLD VENIPUNCTURE: CPT

## 2018-06-05 RX ORDER — HYDROCORTISONE 25 MG/G
CREAM TOPICAL 3 TIMES DAILY PRN
Status: DISCONTINUED | OUTPATIENT
Start: 2018-06-05 | End: 2018-06-07 | Stop reason: HOSPADM

## 2018-06-05 RX ORDER — PRENATAL WITH FERROUS FUM AND FOLIC ACID 3080; 920; 120; 400; 22; 1.84; 3; 20; 10; 1; 12; 200; 27; 25; 2 [IU]/1; [IU]/1; MG/1; [IU]/1; MG/1; MG/1; MG/1; MG/1; MG/1; MG/1; UG/1; MG/1; MG/1; MG/1; MG/1
1 TABLET ORAL DAILY
Status: DISCONTINUED | OUTPATIENT
Start: 2018-06-05 | End: 2018-06-07 | Stop reason: HOSPADM

## 2018-06-05 RX ORDER — OXYTOCIN/RINGER'S LACTATE 20/1000 ML
41.65 PLASTIC BAG, INJECTION (ML) INTRAVENOUS CONTINUOUS
Status: ACTIVE | OUTPATIENT
Start: 2018-06-05 | End: 2018-06-05

## 2018-06-05 RX ORDER — ONDANSETRON 8 MG/1
8 TABLET, ORALLY DISINTEGRATING ORAL EVERY 8 HOURS PRN
Status: DISCONTINUED | OUTPATIENT
Start: 2018-06-05 | End: 2018-06-07 | Stop reason: HOSPADM

## 2018-06-05 RX ORDER — MISOPROSTOL 200 UG/1
600 TABLET ORAL
Status: DISCONTINUED | OUTPATIENT
Start: 2018-06-05 | End: 2018-06-05

## 2018-06-05 RX ORDER — DOCUSATE SODIUM 100 MG/1
200 CAPSULE, LIQUID FILLED ORAL 2 TIMES DAILY PRN
Status: DISCONTINUED | OUTPATIENT
Start: 2018-06-05 | End: 2018-06-07 | Stop reason: HOSPADM

## 2018-06-05 RX ORDER — ACETAMINOPHEN 325 MG/1
650 TABLET ORAL EVERY 6 HOURS PRN
Status: DISCONTINUED | OUTPATIENT
Start: 2018-06-05 | End: 2018-06-07 | Stop reason: HOSPADM

## 2018-06-05 RX ORDER — OXYCODONE AND ACETAMINOPHEN 5; 325 MG/1; MG/1
1 TABLET ORAL EVERY 4 HOURS PRN
Status: DISCONTINUED | OUTPATIENT
Start: 2018-06-05 | End: 2018-06-07 | Stop reason: HOSPADM

## 2018-06-05 RX ORDER — CETIRIZINE HYDROCHLORIDE 10 MG/1
10 TABLET ORAL DAILY PRN
Status: DISCONTINUED | OUTPATIENT
Start: 2018-06-05 | End: 2018-06-07 | Stop reason: HOSPADM

## 2018-06-05 RX ORDER — DOCUSATE SODIUM 100 MG/1
200 CAPSULE, LIQUID FILLED ORAL 2 TIMES DAILY PRN
Qty: 60 CAPSULE | Refills: 0 | COMMUNITY
Start: 2018-06-05

## 2018-06-05 RX ORDER — FENTANYL/BUPIVACAINE/NS/PF 2MCG/ML-.1
PLASTIC BAG, INJECTION (ML) INJECTION CONTINUOUS PRN
Status: DISCONTINUED | OUTPATIENT
Start: 2018-06-05 | End: 2018-06-05

## 2018-06-05 RX ORDER — DIPHENHYDRAMINE HYDROCHLORIDE 50 MG/ML
25 INJECTION INTRAMUSCULAR; INTRAVENOUS EVERY 4 HOURS PRN
Status: DISCONTINUED | OUTPATIENT
Start: 2018-06-05 | End: 2018-06-07 | Stop reason: HOSPADM

## 2018-06-05 RX ORDER — OXYCODONE AND ACETAMINOPHEN 10; 325 MG/1; MG/1
1 TABLET ORAL EVERY 4 HOURS PRN
Status: DISCONTINUED | OUTPATIENT
Start: 2018-06-05 | End: 2018-06-07 | Stop reason: HOSPADM

## 2018-06-05 RX ORDER — IBUPROFEN 600 MG/1
600 TABLET ORAL EVERY 6 HOURS
Qty: 60 TABLET | Refills: 1 | Status: SHIPPED | OUTPATIENT
Start: 2018-06-05

## 2018-06-05 RX ORDER — IBUPROFEN 600 MG/1
600 TABLET ORAL EVERY 6 HOURS
Status: DISCONTINUED | OUTPATIENT
Start: 2018-06-05 | End: 2018-06-07 | Stop reason: HOSPADM

## 2018-06-05 RX ORDER — DIPHENHYDRAMINE HCL 25 MG
25 CAPSULE ORAL EVERY 4 HOURS PRN
Status: DISCONTINUED | OUTPATIENT
Start: 2018-06-05 | End: 2018-06-07 | Stop reason: HOSPADM

## 2018-06-05 RX ORDER — FLUTICASONE PROPIONATE 50 MCG
1 SPRAY, SUSPENSION (ML) NASAL DAILY
Status: DISCONTINUED | OUTPATIENT
Start: 2018-06-05 | End: 2018-06-07 | Stop reason: HOSPADM

## 2018-06-05 RX ORDER — OXYCODONE AND ACETAMINOPHEN 5; 325 MG/1; MG/1
1 TABLET ORAL EVERY 4 HOURS PRN
Qty: 15 TABLET | Refills: 0 | Status: SHIPPED | OUTPATIENT
Start: 2018-06-05

## 2018-06-05 RX ADMIN — IBUPROFEN 600 MG: 600 TABLET, FILM COATED ORAL at 12:06

## 2018-06-05 RX ADMIN — IBUPROFEN 600 MG: 600 TABLET, FILM COATED ORAL at 05:06

## 2018-06-05 RX ADMIN — ONDANSETRON 8 MG: 8 TABLET, ORALLY DISINTEGRATING ORAL at 02:06

## 2018-06-05 RX ADMIN — Medication 10 ML/HR: at 12:06

## 2018-06-05 NOTE — INTERVAL H&P NOTE
The patient has been examined and the H&P has been reviewed:    I concur with the findings and no changes have occurred since H&P was written.    Fetal status reassuring, cervix still 3/70/-3  Will get accucheck, recheck in 2 hours or prn    There are no hospital problems to display for this patient.    Jo Brody, DO

## 2018-06-05 NOTE — PLAN OF CARE
Problem: Patient Care Overview  Goal: Plan of Care Review  Outcome: Ongoing (interventions implemented as appropriate)  Pt denies pain, n/v, h/a, dizziness, fundus firm, midline with moderate bleeding, VSS, afebrile, pt currently doing skin to skin and attempting to breastfeed baby, bed at lowest position, call light within reach, side rails up x2, pt was able to ambulate with assistance, pt voided, pt wheeled to MBU, report given to MB RN.

## 2018-06-05 NOTE — ANESTHESIA PROCEDURE NOTES
Epidural    Patient location during procedure: OB   Reason for block: primary anesthetic   Diagnosis: iup in labor   Start time: 6/5/2018 11:54 PM  Timeout: 6/5/2018 11:53 PM  End time: 6/5/2018 12:08 AM  Staffing  Anesthesiologist: SANA GAMBOA  Resident/CRNA: ABDIRAHMAN CHANDLER  Performed: resident/CRNA   Preanesthetic Checklist  Completed: patient identified, site marked, surgical consent, pre-op evaluation, timeout performed, IV checked, risks and benefits discussed, monitors and equipment checked, anesthesia consent given, hand hygiene performed and patient being monitored  Preparation  Patient position: sitting  Prep: ChloraPrep  Patient monitoring: Pulse Ox and Blood Pressure  Epidural  Skin Anesthetic: lidocaine 1%  Skin Wheal: 3 mL  Administration type: continuous  Approach: midline  Interspace: L3-4  Injection technique: WILLIS saline  Needle and Epidural Catheter  Needle type: Tuohy   Needle gauge: 17  Needle length: 3.5 inches  Needle insertion depth: 6 cm  Catheter type: springwound  Catheter size: 19 G  Catheter at skin depth: 10 cm  Test dose: 3 mL of lidocaine 1.5% with Epi 1-to-200,000  Additional Documentation: incremental injection, negative aspiration for heme and CSF, no paresthesia on injection, no signs/symptoms of IV or SA injection, no significant complaints from patient and no significant pain on injection  Needle localization: anatomical landmarks  Medications:  Bolus administered: 10 mL of 0.1% bupivacaine  Opioid administered: 20 mcg of   fentanyl  Volume per aspiration: 5 mL  Time between aspirations: 3 minutes  Assessment  Ease of block: easy  Patient's tolerance of the procedure: comfortable throughout block and no complaints  Post dural Puncture Headache?: No

## 2018-06-05 NOTE — L&D DELIVERY NOTE
[facility]  Vaginal Delivery   Operative Note    SUMMARY     Normal spontaneous vaginal delivery of live infant after approximately 10 minutes of pushing. After fetal head delivered shoulder was slow to deliver. Lacey performed and shoulder then delivered with ease. Infant was placed on mothers abdomen for skin to skin and bulb suctioning performed.  Infant delivered position ALEXA over perineum.  Nuchal cord: No.    Spontaneous delivery of placenta and IV pitocin given noting good uterine tone. Small amount of placental membranes retained at uterine fundus had to be manually extracted after delivery of placenta.  2nd degree laceration noted and repaired in normal fashion with 3-0 vicryl suture.  Patient tolerated delivery well. Sponge needle and lap counted correctly x2.    Indications: Vaginal delivery  Pregnancy complicated by:   Patient Active Problem List   Diagnosis    Major depression    Anxiety    Diet controlled gestational diabetes mellitus (GDM), antepartum    Vaginal delivery    Abnormal  test     Admitting GA: 39w0d    Delivery Information for  Girl Sarbjit Boateng    Birth information:  YOB: 2019   Time of birth: 6:43 AM   Sex: female   Head Delivery Date/Time: 2018  6:43 AM   Delivery type: Vaginal, Forceps   Gestational Age: 39w0d    Delivery Providers    Delivering clinician:  Giulia Garber MD   Provider Role    Bud Chavarria RN     Renata Garces RN                  Assessment    Apgars:     1 Minute:   5 Minute:   10 Minute:   15 Minute:   20 Minute:     Skin Color:   1  1       Heart Rate:   2  2       Reflex Irritability:   2  2       Muscle Tone:   2  2       Respiratory Effort:   2  2       Total:   9  9                      Assisted Delivery Details:    Forceps attempted?:  No  Vacuum extractor attempted?:  No         Shoulder Dystocia    Shoulder dystocia present?:  No           Presentation and Position     Presentation:  Vertex  Position:  Left Anterior           Interventions/Resuscitation    Method:  Bulb Suctioning, Tactile Stimulation       Cord    Vessels:  3 vessels  Complications:  None  Delayed Cord Clamping?:  No  Cord Blood Disposition:  Sent with Baby  Gases Sent?:  No       Placenta    Date and time:  2018  6:46 AM  Removal:  Spontaneous  Appearance:  Intact  Placenta disposition:  discarded           Labor Events:       labor: No     Labor Onset Date/Time:         Dilation Complete Date/Time:         Start Pushing Date/Time:       Rupture Date/Time:              Rupture type:           Fluid Amount:        Fluid Color:        Fluid Odor:        Membrane Status (PeriCalm): ARM (Artificial Rupture)      Rupture Date/Time (PeriCalm):        Fluid Amount (PeriCalm): Moderate      Fluid Color (PeriCalm): Clear       steroids: None     Antibiotics given for GBS: No     Induction: oxytocin     Indications for induction:        Augmentation:       Indications for augmentation:       Labor complications:       Additional complications:          Cervical ripening:                     Delivery:      Episiotomy: None     Indication for Episiotomy:       Perineal Lacerations: 2nd Repaired:  Yes   Periurethral Laceration: none Repaired:     Labial Laceration: none Repaired:     Sulcus Laceration: none Repaired:     Vaginal Laceration: No Repaired:     Cervical Laceration: No Repaired:     Repair suture:       Repair # of packets: 1     Vaginal delivery QBL (mL): 300      QBL (mL): 0     Combined Blood Loss (mL): 300     Vaginal Sweep Performed:       Surgicount Correct:         Other providers:       Anesthesia    Method:  Epidural

## 2018-06-05 NOTE — LACTATION NOTE
06/05/18 1810   Maternal Infant Feeding   Maternal Emotional State relaxed   Time Spent (min) 0-15 min   Breastfeeding History   Currently Breastfeeding yes   Lactation Referrals   Lactation Consult Knowledge deficit;Initial assessment   Lactation Interventions   Attachment Promotion skin-to-skin contact encouraged;counseling provided   Breastfeeding Assistance support offered   Maternal Breastfeeding Support encouragement offered   Mother just finished nursing baby with assistance of her nurse, ADOLFO Carbajal RN. Reports baby nursed well. Reviewed basic breastfeeding education per Mother's Breastfeeding Guide. Encouraged to ask for assistance as needed.

## 2018-06-05 NOTE — DISCHARGE INSTRUCTIONS
Breastfeeding Discharge Instructions       Feed the baby at the earliest sign of hunger or comfort  o Hands to mouth, sucking motions  o Rooting or searching for something to suck on  o Dont wait for crying - it is a sign of distress     The feedings may be 8-12 times per 24hrs and will not follow a schedule   Avoid pacifiers and bottles for the first 4 weeks   Alternate the breast you start the feeding with, or start with the breast that feels the fullest   Switch breasts when the baby takes himself off the breast or falls asleep   Keep offering breasts until the baby looks full, no longer gives hunger signs, and stays asleep when placed on his back in the crib   If the baby is sleepy and wont wake for a feeding, put the baby skin-to-skin dressed in a diaper against the mothers bare chest   Sleep near your baby   The baby should be positioned and latched on to the breast correctly  o Chest-to-chest, chin in the breast  o Babys lips are flipped outward  o Babys mouth is stretched open wide like a shout  o Babys sucking should feel like tugging to the mother  - The baby should be drinking at the breast:  o You should hear swallowing or gulping throughout the feeding  o You should see milk on the babys lips when he comes off the breast  o Your breasts should be softer when the baby is finished feeding  o The baby should look relaxed at the end of feedings  o After the 4th day and your milk is in:  o The babys poop should turn bright yellow and be loose, watery, and seedy  o The baby should have at least 3-4 poops the size of the palm of your hand per day  o The baby should have at least 5-6 wet diapers per day  o The urine should be light yellow in color  You should drink when you are thirsty and eat a healthy diet when you are    hungry.     Take naps to get the rest you need.   Take medications and/or drink alcohol only with permission of your obstetrician    or the babys pediatrician.  You can  also call the Infant Risk Center,   ([phone]), Monday-Friday, 8am-5pm Central time, to get the most   up-to-date evidence-based information on the use of medications during   pregnancy and breastfeeding.      The baby should be examined by a pediatrician at 3-5 days of age.  Once your   milk comes in, the baby should be gaining at least ½ - 1oz each day and should be back to birthweight no later than 10-14 days of age.          Community Resources    [facility] Breastfeeding Warmline: [phone]   Local [facility] clinics: provide incentives and breastpumps to eligible mothers  La Leche [employer] (LLLI):  mother-to-mother support group website        www.Whydl.Camperoo  Local La Leche League mother-to-mother support groups:        www.BGS International        La Leche League [facility]   Dr. Romel Geronimo website for latch videos and general information:        www.breastfeedinginc.ca  [facility] Risk [facility] is a call center that provides information about the safety of taking medications while breastfeeding.  Call [phone], M-F, 8am-5pm, CT.  International Lactation Consultant Association provides resources for assistance:        www.ilca.org  [place] Breastfeeding Coalition provides informationand resources for parents  and the community    www.LaBreastfeedingSupport.org     Susana Shearer is a mom-to-mom support group:                             www.37coinscainWeeleo.com//breastfeedng-support/  Partners for Healthy Babies:  3-363-233-BABY(2329)  [employer]: a breastfeeding support group for women of color, [phone]

## 2018-06-05 NOTE — PROGRESS NOTES
Labor Progress Note        Subjective:      Patient currently doing well without complaints, s/p epidural    Objective:      BP: (130)/(70) 130/70  Body mass index is 30.23 kg/m².     General: no acute distress  Electronic Fetal Monitoring:  FHT: 125 bpm, moderate variability, accelerations present, decelerations absent   Category: 1                 TOCO: Contractions: regular, every 1-2 minutes     4.5/80/-3     Assessment:     1. IUP at 39w0d here for induction of labor for 6/8 BPP  2. GBS negative  3. Category 1 FHT  4. Epidural: yes  5. Ruptured: no     Plan:     1. Continue active management of labor  2. Recheck in 2 hours or prn    Jo Brody DO

## 2018-06-05 NOTE — PROGRESS NOTES
Labor Progress Note        Subjective:      Patient currently doing well without complaints    Objective:      Pulse:  [] 76  SpO2:  [94 %-100 %] 95 %  BP: (100-134)/(55-72) 100/59  Body mass index is 30.23 kg/m².     General: no acute distress  Electronic Fetal Monitoring:  FHT: 125 bpm, moderate variability, accelerations present, decelerations present - early  Category: 1                 TOCO: Contractions: regular, every 2 minutes     Cervix 9/90/0     Assessment:     1. IUP at 39w0d here for induction of labor for 6/8 BPP  2. GBS negative  3. Category 1 FHT  4. Epidural: yes  5. Ruptured: yes, AROMed 0230, clear  6. Internalized: no     Plan:      1. Continue active management of labor  2. Recheck in 2 hours or prn     Jo Brody DO

## 2018-06-05 NOTE — PLAN OF CARE
Problem: Patient Care Overview  Goal: Plan of Care Review  Outcome: Ongoing (interventions implemented as appropriate)  Patient is breastfeeding and is to follow basic breastfeeding education provided

## 2018-06-05 NOTE — ANESTHESIA PREPROCEDURE EVALUATION
2018  Sarbjit Boateng is a 37 y.o., female with gestational DM here for IOL.      OB History    Para Term  AB Living   4 1 1   2 1   SAB TAB Ectopic Multiple Live Births   2       1      # Outcome Date GA Lbr [place]/2nd Weight Sex Delivery Anes PTL Lv   4 Current            3 SAB  6w0d          2 Term 2012 40w0d  3.487 kg (7 lb 11 oz) F Vag-Spont EPI  KENDALL   1 SAB  11w0d                 Wt Readings from Last 1 Encounters:   18 1630 87.6 kg (193 lb 3.7 oz)       BP Readings from Last 3 Encounters:   18 130/70   18 126/84   18 118/76       Patient Active Problem List   Diagnosis    Major depression    Anxiety    Abnormal  test       Past Surgical History:   Procedure Laterality Date    WISDOM TOOTH EXTRACTION         Social History     Social History    Marital status:      Spouse name: N/A    Number of children: N/A    Years of education: N/A     Occupational History    Not on file.     Social History Main Topics    Smoking status: Never Smoker    Smokeless tobacco: Never Used    Alcohol use 0.0 oz/week      Comment: occassional drinking, 3-4 in one sitting     Drug use: No    Sexual activity: Yes     Partners: Male     Birth control/ protection: None     Other Topics Concern    Not on file     Social History Narrative    No narrative on file         Chemistry        Component Value Date/Time     2015 1003    K 4.5 2015 1003     2015 1003    CO2 26 2015 1003    BUN 9 2015 1003    CREATININE 0.9 2015 1003     2015 1003        Component Value Date/Time    CALCIUM 8.7 2015 1003    ALKPHOS 56 2015 1003    AST 17 2015 1003    ALT 11 2015 1003    BILITOT 0.5 2015 1003    ESTGFRAFRICA >60.0 2015 1003    EGFRNONAA >60.0 2015 1003             Lab Results   Component Value Date    WBC 9.85 06/04/2018    HGB 10.1 (L) 06/04/2018    HCT 31.0 (L) 06/04/2018    MCV 87 06/04/2018     06/04/2018       No results for input(s): PT, INR, PROTIME, APTT in the last 72 hours.                    Anesthesia Evaluation    I have reviewed the Patient Summary Reports.     I have reviewed the Medications.     Review of Systems  Anesthesia Hx:  No problems with previous Anesthesia    Cardiovascular:  Cardiovascular Normal     Pulmonary:  Pulmonary Normal    Renal/:  Renal/ Normal     Hepatic/GI:  Hepatic/GI Normal    Musculoskeletal:  Musculoskeletal Normal    Neurological:  Neurology Normal    Endocrine:   Diabetes, gestational        Physical Exam  General:  Well nourished    Airway/Jaw/Neck:  Airway Findings: Mouth Opening: Normal Tongue: Normal  Mallampati: III  Improves to II with phonation.  TM Distance: Normal, at least 6 cm  Jaw/Neck Findings:  Neck ROM: Normal ROM     Eyes/Ears/Nose:  EYES/EARS/NOSE FINDINGS: Normal   Dental:  Dental Findings: In tact   Chest/Lungs:  Chest/Lungs Findings: Normal Respiratory Rate     Heart/Vascular:  Heart Findings: Rate: Normal  Rhythm: Regular Rhythm        Mental Status:  Mental Status Findings: Normal        Anesthesia Plan  Type of Anesthesia, risks & benefits discussed:  Anesthesia Type:  CSE, epidural, general, spinal  Patient's Preference:   Intra-op Monitoring Plan: standard ASA monitors  Intra-op Monitoring Plan Comments:   Post Op Pain Control Plan:   Post Op Pain Control Plan Comments:   Induction:   IV  Beta Blocker:  Patient is not currently on a Beta-Blocker (No further documentation required).       Informed Consent: Patient understands risks and agrees with Anesthesia plan.  Questions answered. Anesthesia consent signed with patient.  ASA Score: 2     Day of Surgery Review of History & Physical:    H&P update referred to the surgeon.         Ready For Surgery From Anesthesia Perspective.

## 2018-06-06 LAB — POCT GLUCOSE: 104 MG/DL (ref 70–110)

## 2018-06-06 PROCEDURE — 11000001 HC ACUTE MED/SURG PRIVATE ROOM

## 2018-06-06 PROCEDURE — 99024 POSTOP FOLLOW-UP VISIT: CPT | Mod: ,,, | Performed by: OBSTETRICS & GYNECOLOGY

## 2018-06-06 PROCEDURE — 25000003 PHARM REV CODE 250: Performed by: OBSTETRICS & GYNECOLOGY

## 2018-06-06 RX ADMIN — PRENATAL VIT W/ FE FUMARATE-FA TAB 27-0.8 MG 1 TABLET: 27-0.8 TAB at 08:06

## 2018-06-06 RX ADMIN — IBUPROFEN 600 MG: 600 TABLET, FILM COATED ORAL at 06:06

## 2018-06-06 RX ADMIN — IBUPROFEN 600 MG: 600 TABLET, FILM COATED ORAL at 01:06

## 2018-06-06 RX ADMIN — IBUPROFEN 600 MG: 600 TABLET, FILM COATED ORAL at 12:06

## 2018-06-06 NOTE — PROGRESS NOTES
[facility]  Obstetrics  Postpartum Progress Note    Patient Name: Sarbjit Boateng  MRN: [id number]  Admission Date: 6/4/2018  Hospital Length of Stay: 2 days  Attending Physician: Giulia Garber, *  Primary Care Provider: Primary Doctor No    Subjective:     Principal Problem:Vaginal delivery    Hospital course: PPD1: Sarbjit is in good condition, no major complaints at this time.  Baby is pending repeat echo    She is doing well this morning. She is tolerating a regular diet without nausea or vomiting. She is voiding spontaneously. She is ambulating. She has passed flatus, and has not a BM. Vaginal bleeding is mild. She denies fever or chills. Abdominal pain is mild and controlled with oral medications. She is breastfeeding.     Objective:     Vital Signs (Most Recent):  Temp: 98 °F (36.7 °C) (06/06/18 0800)  Pulse: 90 (06/06/18 0800)  Resp: 18 (06/06/18 0800)  BP: (!) 108/55 (06/06/18 0800)  SpO2: 98 % (06/06/18 0800) Vital Signs (24h Range):  Temp:  [98 °F (36.7 °C)-98.2 °F (36.8 °C)] 98 °F (36.7 °C)  Pulse:  [90-94] 90  Resp:  [18] 18  SpO2:  [97 %-98 %] 98 %  BP: (108-110)/(52-55) 108/55     Weight: 87.5 kg (193 lb)  Body mass index is 30.23 kg/m².    No intake or output data in the 24 hours ending 06/06/18 1521    Significant Labs:  Lab Results   Component Value Date    GROUPTRH O POS 06/04/2018    HEPBSAG Negative 10/16/2017    STREPBCULT No Group B Streptococcus isolated 05/14/2018       Recent Labs  Lab 06/05/18  1914   HGB 7.4*   HCT 23.4*     Physical Exam:   Constitutional: She is oriented to person, place, and time. She appears well-developed and well-nourished. No distress.    HENT:   Head: Normocephalic and atraumatic.      Cardiovascular: Normal rate.     Pulmonary/Chest: Effort normal. No respiratory distress.        Abdominal: Bowel sounds are normal. She exhibits no distension. There is no tenderness. There is no rebound and no guarding.   Fundus firm, NT, below  umbilicus                   Neurological: She is alert and oriented to person, place, and time.    Skin: Skin is warm and dry. She is not diaphoretic.    Psychiatric: She has a normal mood and affect.       Assessment/Plan:     37 y.o. female  for:    * Vaginal delivery    Good post-partum condition, continue routine care        Diet controlled gestational diabetes mellitus (GDM), antepartum    Post-partum fasting glucose 104, no further immediate work-up needed            Disposition: As patient meets milestones, will plan to discharge POD3.    Jo Brody, DO  Obstetrics  [facility]

## 2018-06-06 NOTE — SUBJECTIVE & OBJECTIVE
Hospital course: PPD1: Sarbjit is in good condition, no major complaints at this time.  Baby is pending repeat echo    She is doing well this morning. She is tolerating a regular diet without nausea or vomiting. She is voiding spontaneously. She is ambulating. She has passed flatus, and has not a BM. Vaginal bleeding is mild. She denies fever or chills. Abdominal pain is mild and controlled with oral medications. She is breastfeeding.     Objective:     Vital Signs (Most Recent):  Temp: 98 °F (36.7 °C) (06/06/18 0800)  Pulse: 90 (06/06/18 0800)  Resp: 18 (06/06/18 0800)  BP: (!) 108/55 (06/06/18 0800)  SpO2: 98 % (06/06/18 0800) Vital Signs (24h Range):  Temp:  [98 °F (36.7 °C)-98.2 °F (36.8 °C)] 98 °F (36.7 °C)  Pulse:  [90-94] 90  Resp:  [18] 18  SpO2:  [97 %-98 %] 98 %  BP: (108-110)/(52-55) 108/55     Weight: 87.5 kg (193 lb)  Body mass index is 30.23 kg/m².    No intake or output data in the 24 hours ending 06/06/18 1521    Significant Labs:  Lab Results   Component Value Date    GROUPTRH O POS 06/04/2018    HEPBSAG Negative 10/16/2017    STREPBCULT No Group B Streptococcus isolated 05/14/2018       Recent Labs  Lab 06/05/18  1914   HGB 7.4*   HCT 23.4*     Physical Exam:   Constitutional: She is oriented to person, place, and time. She appears well-developed and well-nourished. No distress.    HENT:   Head: Normocephalic and atraumatic.      Cardiovascular: Normal rate.     Pulmonary/Chest: Effort normal. No respiratory distress.        Abdominal: Bowel sounds are normal. She exhibits no distension. There is no tenderness. There is no rebound and no guarding.   Fundus firm, NT, below umbilicus                   Neurological: She is alert and oriented to person, place, and time.    Skin: Skin is warm and dry. She is not diaphoretic.    Psychiatric: She has a normal mood and affect.

## 2018-06-06 NOTE — HOSPITAL COURSE
PPD1: Luis Fernandoman is in good condition, no major complaints at this time.  Baby is pending repeat echo  PPD2: Patient is without complaint, breastfeeding well.

## 2018-06-06 NOTE — ANESTHESIA POSTPROCEDURE EVALUATION
"Anesthesia Post Evaluation    Patient: Sarbjit Boateng    Procedure(s) Performed: * No procedures listed *    Final Anesthesia Type: epidural  Patient location during evaluation: labor & delivery  Patient participation: Yes- Able to Participate  Level of consciousness: awake and alert and oriented  Post-procedure vital signs: reviewed and stable  Pain management: adequate  Airway patency: patent  PONV status at discharge: No PONV  Anesthetic complications: no      Cardiovascular status: blood pressure returned to baseline  Respiratory status: unassisted  Hydration status: euvolemic  Follow-up not needed.        Visit Vitals  BP (!) 108/55   Pulse 90   Temp 36.7 °C (98 °F) (Oral)   Resp 18   Ht 5' 7" (1.702 m)   Wt 87.5 kg (193 lb)   LMP 09/05/2017   SpO2 98%   Breastfeeding? Unknown   BMI 30.23 kg/m²       Pain/Dennise Score: Pain Assessment Performed: Yes (6/5/2018  5:45 PM)  Presence of Pain: complains of pain/discomfort (6/5/2018  5:45 PM)  Pain Rating Prior to Med Admin: 0 (6/6/2018  6:05 AM)  Pain Rating Post Med Admin: 0 (6/6/2018  1:00 AM)      "

## 2018-06-06 NOTE — PLAN OF CARE
Problem: Patient Care Overview  Goal: Plan of Care Review  Outcome: Ongoing (interventions implemented as appropriate)  Mother to breastfeed infant 8 or more times in 24hrs on infant's cue until content, frequent skin to skin, and will avoid bottles and pacifiers.  Mother is to keep infant actively feeding by keeping infant stimulated and using breast compression. Mother ensure effective nursing by hearing infant swallows and feeling nice tugs and pulls. Latch should not be painful while nursing.  Mother to record all breastfeedings, voids and stools in breastfeeding guide. Mother to call  for breastfeeding assistance or questions .  Refer breastfeeding guide for lactation education.

## 2018-06-06 NOTE — LACTATION NOTE
"   06/06/18 3899   Infant Information   Infant's Name La   Maternal Infant Assessment   Breast Shape Bilateral:;round   Breast Density Bilateral:;soft   Areola Bilateral:;elastic   Nipple(s) Bilateral:;everted   Nipple Symptoms bilateral:;tender   Infant Assessment   Weight Loss (%) 3.5   Sucking Reflex present   Rooting Reflex present   Swallow Reflex present   LATCH Score   Latch 1-->repeated attempts, holds nipple in mouth, stimulate to suck   Audible Swallowing 1-->a few with stimulation   Type Of Nipple 2-->everted (after stimulation)   Comfort (Breast/Nipple) 1-->filling, red/small blisters/bruises, mild/mod discomfort   Hold (Positioning) 1-->minimal assist, teach one side: mother does other, staff holds   Score (less than 7 for 2/more consecutive times, consult Lactation Consultant) 6   Pain/Comfort Assessments   Pain Assessment Performed Yes       Number Scale   Presence of Pain denies   Location nipple(s)   Pain Rating: Rest 0  (inital tenderness, better with deeper latch)   Maternal Infant Feeding   Maternal Emotional State assist needed   Infant Positioning clutch/"football";cross-cradle   Signs of Milk Transfer audible swallow;infant jaw motion present   Time Spent (min) 0-15 min   Nipple Shape After Feeding, Left round   Nipple Shape After Feeding, Right compressed   Latch Assistance yes   Breastfeeding Education adequate infant intake;importance of skin-to-skin contact   Breastfeeding History   Currently Breastfeeding yes   Feeding Infant   Feeding Readiness Cues rooting;crying   Effective Latch During Feeding yes   Audible Swallow yes   Suck/Swallow Coordination present   Skin-to-Skin Contact During Feeding yes   Lactation Referrals   Lactation Consult Breastfeeding assessment;Breast/nipple pain;Follow up;Knowledge deficit   Lactation Interventions   Attachment Promotion breastfeeding assistance provided;counseling provided;environment adjusted;family involvement promoted;face-to-face positioning " promoted;infant-mother separation minimized;privacy provided;role responsibility promoted;rooming-in promoted;skin-to-skin contact encouraged   Breastfeeding Assistance assisted with positioning;both breasts offered each feeding;infant latch-on verified;support offered;infant suck/swallow verified   Maternal Breastfeeding Support diary/feeding log utilized;encouragement offered;infant-mother separation minimized;lactation counseling provided   Latch Promotion positioning assisted;infant moved to breast;suck stimulated with colostrum drop   1250- LC to room, LC number on board to call for assessment next feeding.  1430- LC called to room by patient, basic education reviewed with breastfeeding guide. Infant already nursing on R side, infant unlatched, nipple with compression line, LC assisted with positioning and latch in cross cradle on R side, infant off/on, fussy, requiring constant breast compression. LC assisted with positioning and latch in FB on L side, infant sustained latch better in this position with mother doing constant breast compression. Mother reports baby had hiccups in utero. Discussed breast compression and infant stimulation throughout feeding and hand expressing after feeds PRN. LC number on board, to call for further assistance or questions.

## 2018-06-07 VITALS
WEIGHT: 193 LBS | HEIGHT: 67 IN | HEART RATE: 88 BPM | RESPIRATION RATE: 17 BRPM | BODY MASS INDEX: 30.29 KG/M2 | SYSTOLIC BLOOD PRESSURE: 111 MMHG | TEMPERATURE: 98 F | DIASTOLIC BLOOD PRESSURE: 56 MMHG | OXYGEN SATURATION: 99 %

## 2018-06-07 PROBLEM — O28.9 ABNORMAL ANTENATAL TEST: Status: RESOLVED | Noted: 2018-06-05 | Resolved: 2018-06-07

## 2018-06-07 PROCEDURE — 99024 POSTOP FOLLOW-UP VISIT: CPT | Mod: ,,, | Performed by: OBSTETRICS & GYNECOLOGY

## 2018-06-07 PROCEDURE — 25000003 PHARM REV CODE 250: Performed by: OBSTETRICS & GYNECOLOGY

## 2018-06-07 RX ADMIN — IBUPROFEN 600 MG: 600 TABLET, FILM COATED ORAL at 12:06

## 2018-06-07 RX ADMIN — PRENATAL VIT W/ FE FUMARATE-FA TAB 27-0.8 MG 1 TABLET: 27-0.8 TAB at 11:06

## 2018-06-07 RX ADMIN — IBUPROFEN 600 MG: 600 TABLET, FILM COATED ORAL at 05:06

## 2018-06-07 RX ADMIN — IBUPROFEN 600 MG: 600 TABLET, FILM COATED ORAL at 11:06

## 2018-06-07 NOTE — PROGRESS NOTES
[facility]  Obstetrics  Postpartum Progress Note    Patient Name: Sarbjit Boateng  MRN: [id number]  Admission Date: 6/4/2018  Hospital Length of Stay: 3 days  Attending Physician: Giulia Garber, *  Primary Care Provider: Primary Doctor No    Subjective:     Principal Problem:Vaginal delivery    Hospital course: PPD1: Sarbjit is in good condition, no major complaints at this time.  Baby is pending repeat echo  PPD2: Patient is without complaint, breastfeeding well.    Interval History:     She is doing well this morning. She is tolerating a regular diet without nausea or vomiting. She is voiding spontaneously. She is ambulating. She has passed flatus, and has a BM. Vaginal bleeding is mild. She denies fever or chills. Abdominal pain is moderate and controlled with oral medications. She is breastfeeding.     Objective:     Vital Signs (Most Recent):  Temp: 98.2 °F (36.8 °C) (06/07/18 0900)  Pulse: 88 (06/07/18 0900)  Resp: 17 (06/07/18 0900)  BP: (!) 111/56 (06/07/18 0900)  SpO2: 99 % (06/07/18 0900) Vital Signs (24h Range):  Temp:  [98 °F (36.7 °C)-98.2 °F (36.8 °C)] 98.2 °F (36.8 °C)  Pulse:  [67-89] 88  Resp:  [17-18] 17  SpO2:  [97 %-99 %] 99 %  BP: (105-111)/(52-56) 111/56     Weight: 87.5 kg (193 lb)  Body mass index is 30.23 kg/m².    No intake or output data in the 24 hours ending 06/07/18 1105    Significant Labs:  Lab Results   Component Value Date    GROUPTRH O POS 06/04/2018    HEPBSAG Negative 10/16/2017    STREPBCULT No Group B Streptococcus isolated 05/14/2018       Recent Labs  Lab 06/05/18  1914   HGB 7.4*   HCT 23.4*       I have personallly reviewed all pertinent lab results from the last 24 hours.    Physical Exam:   Constitutional: She is oriented to person, place, and time. She appears well-developed and well-nourished.        Pulmonary/Chest: Effort normal.        Abdominal: Soft. She exhibits no abdominal incision. There is no tenderness (No fundal tenderness).              Musculoskeletal: She exhibits edema (1+ edema bilaterally). She exhibits no tenderness.       Neurological: She is alert and oriented to person, place, and time.     Psychiatric: She has a normal mood and affect.       Assessment/Plan:     37 y.o. female  for:    * Vaginal delivery    Good post-partum condition, continue routine care. Discharge instructions reviewed.        Diet controlled gestational diabetes mellitus (GDM), antepartum    Post-partum fasting glucose 104, no further immediate work-up needed. Encouraged repeat GTT at 6 week check.            Disposition: As patient meets milestones, will plan to discharge today.    Lona Mejia MD  Obstetrics  [facility]

## 2018-06-07 NOTE — SUBJECTIVE & OBJECTIVE
Hospital course: PPD1: Sarbjit is in good condition, no major complaints at this time.  Baby is pending repeat echo  PPD2: Patient is without complaint, breastfeeding well.    Interval History:     She is doing well this morning. She is tolerating a regular diet without nausea or vomiting. She is voiding spontaneously. She is ambulating. She has passed flatus, and has a BM. Vaginal bleeding is mild. She denies fever or chills. Abdominal pain is moderate and controlled with oral medications. She is breastfeeding.     Objective:     Vital Signs (Most Recent):  Temp: 98.2 °F (36.8 °C) (06/07/18 0900)  Pulse: 88 (06/07/18 0900)  Resp: 17 (06/07/18 0900)  BP: (!) 111/56 (06/07/18 0900)  SpO2: 99 % (06/07/18 0900) Vital Signs (24h Range):  Temp:  [98 °F (36.7 °C)-98.2 °F (36.8 °C)] 98.2 °F (36.8 °C)  Pulse:  [67-89] 88  Resp:  [17-18] 17  SpO2:  [97 %-99 %] 99 %  BP: (105-111)/(52-56) 111/56     Weight: 87.5 kg (193 lb)  Body mass index is 30.23 kg/m².    No intake or output data in the 24 hours ending 06/07/18 1105    Significant Labs:  Lab Results   Component Value Date    GROUPTRH O POS 06/04/2018    HEPBSAG Negative 10/16/2017    STREPBCULT No Group B Streptococcus isolated 05/14/2018       Recent Labs  Lab 06/05/18  1914   HGB 7.4*   HCT 23.4*       I have personallly reviewed all pertinent lab results from the last 24 hours.    Physical Exam:   Constitutional: She is oriented to person, place, and time. She appears well-developed and well-nourished.        Pulmonary/Chest: Effort normal.        Abdominal: Soft. She exhibits no abdominal incision. There is no tenderness (No fundal tenderness).             Musculoskeletal: She exhibits edema (1+ edema bilaterally). She exhibits no tenderness.       Neurological: She is alert and oriented to person, place, and time.     Psychiatric: She has a normal mood and affect.

## 2018-06-07 NOTE — DISCHARGE SUMMARY
[facility]  Obstetrics  Discharge Summary      Patient Name: Sarbjit Boateng  MRN: [id number]  Admission Date: 2018  Hospital Length of Stay: 3 days  Discharge Date and Time:  2018 11:08 AM  Attending Physician: Benjamin Villagomez, *   Discharging Provider: Lona Mejia MD  Primary Care Provider: Primary Doctor No    HPI: Sarbjit Boateng is a 37 y.o.  female with IUP at 38w6d weeks gestation who presents to L&D for induction of labor secondary to abnormal  testing, BPP  for no breathing, for the second week in a row. Patient is undergoing  testing secondary to diet controlled gDM. Pertinent medical history for this pregnancy includes AMA with normal quad screening.  Patient reports normal fetal movement.  She denies contractions, vaginal bleeding and leakage of fluid.  Care this pregnancy has been with Dr. Villagomez    * No surgery found *     Hospital Course:   PPD1: Sarbjit is in good condition, no major complaints at this time.  Baby is pending repeat echo  PPD2: Patient is without complaint, breastfeeding well.    Consults         Status Ordering Provider     Inpatient consult to Anesthesiology  Once     Provider:  (Not yet assigned)    Acknowledged BENJAMIN VILLAGOMEZ          Final Active Diagnoses:    Diagnosis Date Noted POA    PRINCIPAL PROBLEM:  Vaginal delivery [O80] 2018 Not Applicable    Diet controlled gestational diabetes mellitus (GDM), antepartum [O24.410] 2018 Yes      Problems Resolved During this Admission:    Diagnosis Date Noted Date Resolved POA    Abnormal  test [O28.9] 2018 Yes    Abnormal  test [O28.9] 2018 Yes            Feeding Method: breast    Immunizations     Date Immunization Status Dose Route/Site Given by    18 0751 MMR Incomplete 0.5 mL Subcutaneous/Left deltoid     18 0751 Tdap Incomplete 0.5 mL Intramuscular/Left deltoid            Delivery:    Episiotomy: None   Lacerations: 2nd   Repair suture:     Repair # of packets: 1   Blood loss (ml): 300     Birth information:  YOB: 2019   Time of birth: 6:43 AM   Sex: female   Delivery type: Vaginal, Spontaneous Delivery   Gestational Age: 39w0d    Delivery Clinician:      Other providers:       Additional  information:  Forceps:    Vacuum:    Breech:    Observed anomalies      Living?:           APGARS  One minute Five minutes Ten minutes   Skin color:         Heart rate:         Grimace:         Muscle tone:         Breathing:         Totals: 9  9        Placenta: Delivered:       appearance    Pending Diagnostic Studies:     None          Discharged Condition: good    Disposition: Home or Self Care    Follow Up:  Follow-up Information     Giulia Garber MD In 6 weeks.    Specialty:  Obstetrics and Gynecology  Why:  post partum exam  Contact information:  [address]  [address]  [place] [address]  [phone]                 Patient Instructions:     Call MD for:  temperature >100.4     Call MD for:  persistent nausea and vomiting or diarrhea     Call MD for:  severe uncontrolled pain     Call MD for:  redness, tenderness, or signs of infection (pain, swelling, redness, odor or green/yellow discharge around incision site)     No dressing needed       Medications:  Current Discharge Medication List      START taking these medications    Details   docusate sodium (COLACE) 100 MG capsule Take 2 capsules (200 mg total) by mouth 2 (two) times daily as needed for Constipation.  Qty: 60 capsule, Refills: 0      ibuprofen (ADVIL,MOTRIN) 600 MG tablet Take 1 tablet (600 mg total) by mouth every 6 (six) hours.  Qty: 60 tablet, Refills: 1      oxyCODONE-acetaminophen (PERCOCET) 5-325 mg per tablet Take 1 tablet by mouth every 4 (four) hours as needed.  Qty: 15 tablet, Refills: 0         CONTINUE these medications which have NOT CHANGED    Details   fluticasone (FLONASE) 50  mcg/actuation nasal spray 1 spray by Each Nare route once daily.      prenatal vit 10-iron-folic-dha 65-1-250 mg Cmpk Take 2 tablets by mouth once daily.         STOP taking these medications       blood sugar diagnostic (BLOOD GLUCOSE TEST) Strp Comments:   Reason for Stopping:         doxylamine succinate (UNISOM, DOXYLAMINE,) 25 mg tablet Comments:   Reason for Stopping:         lancets (ONETOUCH DELICA LANCETS) 33 gauge Misc Comments:   Reason for Stopping:         loratadine (CLARITIN) 10 mg tablet Comments:   Reason for Stopping:         ondansetron (ZOFRAN-ODT) 4 MG TbDL Comments:   Reason for Stopping:               Lona Mejia MD  Obstetrics  [facility]

## 2018-06-07 NOTE — PLAN OF CARE
Problem: Patient Care Overview  Goal: Plan of Care Review  Outcome: Ongoing (interventions implemented as appropriate)  Pt ambulating, voiding, and passing flatus. Pt tolerating po well. No s/s of distress at this time. Pt pain well controlled by oral pain medication. Pt bleeding light throughout shift and fundus is firm. Discharge instructions provided. Verbalized understanding. Awaiting transport for discharge.

## 2018-06-07 NOTE — ASSESSMENT & PLAN NOTE
Post-partum fasting glucose 104, no further immediate work-up needed. Encouraged repeat GTT at 6 week check.

## 2018-06-08 NOTE — LACTATION NOTE
"   06/07/18 0900   Maternal Infant Assessment   Breast Shape Bilateral:;round   Breast Density Bilateral:;soft   Areola Bilateral:;elastic   Nipple(s) Bilateral:;everted   Infant Assessment   Sucking Reflex present   Rooting Reflex present   Swallow Reflex present   LATCH Score   Latch 1-->repeated attempts, holds nipple in mouth, stimulate to suck   Audible Swallowing 2-->spontaneous and intermittent (24 hrs old)   Type Of Nipple 2-->everted (after stimulation)   Comfort (Breast/Nipple) 2-->soft/nontender   Hold (Positioning) 1-->minimal assist, teach one side: mother does other, staff holds   Score (less than 7 for 2/more consecutive times, consult Lactation Consultant) 8   Pain/Comfort Assessments   Pain Assessment Performed Yes   Acceptable Comfort Level 3       Number Scale   Presence of Pain denies   Pain Rating: Rest 0   Pain Rating: Activity 0   Maternal Infant Feeding   Maternal Emotional State assist needed   Infant Positioning clutch/"football";cross-cradle   Signs of Milk Transfer audible swallow   Time Spent (min) 15-30 min   Latch Assistance yes   Breastfeeding History   Currently Breastfeeding yes   Feeding Infant   Effective Latch During Feeding yes   Audible Swallow yes   Suck/Swallow Coordination present   Lactation Referrals   Lactation Consult Breastfeeding assessment;Follow up   Lactation Interventions   Attachment Promotion breastfeeding assistance provided;counseling provided;skin-to-skin contact encouraged   Breastfeeding Assistance assisted with positioning;infant latch-on verified;infant suck/swallow verified;support offered   Maternal Breastfeeding Support lactation counseling provided   Latch Promotion positioning assisted;infant moved to breast;suck stimulated with colostrum drop   discharge breastfeeding education. Questions answered.  "

## 2018-07-17 NOTE — PROGRESS NOTES
Subjective:      Sarbjit Boateng is a 37 y.o.  who presents for a postpartum visit.  She is status post  uncomplicated vaginal delivery 6 weeks ago.  Her hospitalization was not complicated.  She is breastfeeding.  She desires Nexplanon for contraception.  She denies any signs and symptoms of postpartum depression.     Her last pap was NILM, HPV neg on 2017     Past Medical History:   Diagnosis Date    Anxiety     Depression     Gestational diabetes      Objective:     General: healthy, no distress  Abdomen: Normal, benign.  External Genitalia: normal, well-healed, without lesions or masses  Vagina: normal, well-healed, physiologic discharge, without lesions  Cervix: normal, well-healed, without lesions  Uterus: normal size, well involuted, firm, non-tender  Adnexa: no masses palpable and nontender    Procedure:     Nexplanon placement:    Patient was counseled on Risks, benefits and alternatives to implanon placement and after all of her questions were answered she agreed to proceed.     Time out performed.    Procedure in detail:   Medial area left upper arm cleaned with alcohol, marked 5cm from medial condyle. Area injected with 1% lidocaine. Area then cleaned with betadine. Nexplanon insertion device then positioned 5 cm from medial condyle in the usual fashion. Patient palpated and was able to confirm placement. Betadine cleaned and band-aid placed. Pressure dressing placed and postop care discussed.     Assessment:     Nexplanon insertion  -     etonogestrel Impl 68 mg; 68 mg by Subdermal route once.    Postpartum care and examination    H/O gestational diabetes mellitus, not currently pregnant  -     Glucose Tolerance 2 Hour; Future; Expected date: 2018       Plan:     1. Return to clinic in 2017 for annual exam

## 2018-07-18 ENCOUNTER — POSTPARTUM VISIT (OUTPATIENT)
Dept: OBSTETRICS AND GYNECOLOGY | Facility: CLINIC | Age: 38
End: 2018-07-18
Payer: COMMERCIAL

## 2018-07-18 VITALS
SYSTOLIC BLOOD PRESSURE: 120 MMHG | HEIGHT: 67 IN | DIASTOLIC BLOOD PRESSURE: 76 MMHG | WEIGHT: 176.38 LBS | BODY MASS INDEX: 27.68 KG/M2

## 2018-07-18 DIAGNOSIS — Z86.32 H/O GESTATIONAL DIABETES MELLITUS, NOT CURRENTLY PREGNANT: ICD-10-CM

## 2018-07-18 DIAGNOSIS — Z30.017 NEXPLANON INSERTION: Primary | ICD-10-CM

## 2018-07-18 PROCEDURE — 11981 INSERTION DRUG DLVR IMPLANT: CPT | Mod: S$GLB,,, | Performed by: OBSTETRICS & GYNECOLOGY

## 2018-07-18 PROCEDURE — 0503F POSTPARTUM CARE VISIT: CPT | Mod: S$GLB,,, | Performed by: OBSTETRICS & GYNECOLOGY

## 2018-07-18 PROCEDURE — 99999 PR PBB SHADOW E&M-EST. PATIENT-LVL III: CPT | Mod: PBBFAC,,, | Performed by: OBSTETRICS & GYNECOLOGY

## 2018-08-03 ENCOUNTER — LAB VISIT (OUTPATIENT)
Dept: LAB | Facility: HOSPITAL | Age: 38
End: 2018-08-03
Attending: OBSTETRICS & GYNECOLOGY
Payer: COMMERCIAL

## 2018-08-03 ENCOUNTER — TELEPHONE (OUTPATIENT)
Dept: OBSTETRICS AND GYNECOLOGY | Facility: CLINIC | Age: 38
End: 2018-08-03

## 2018-08-03 DIAGNOSIS — Z86.32 H/O GESTATIONAL DIABETES MELLITUS, NOT CURRENTLY PREGNANT: ICD-10-CM

## 2018-08-03 LAB
GLUCOSE SERPL-MCNC: 101 MG/DL
GLUCOSE SERPL-MCNC: 227 MG/DL
GLUCOSE SERPL-MCNC: 90 MG/DL

## 2018-08-03 PROCEDURE — 82951 GLUCOSE TOLERANCE TEST (GTT): CPT

## 2018-08-03 NOTE — TELEPHONE ENCOUNTER
----- Message from Giulia Garber MD sent at 8/3/2018  5:15 PM CDT -----  Please call patient and let her know that her glucose tolerance test is normal. She does not have diabetes outside of pregnancy.

## 2018-08-20 ENCOUNTER — TELEPHONE (OUTPATIENT)
Dept: OBSTETRICS AND GYNECOLOGY | Facility: CLINIC | Age: 38
End: 2018-08-20

## 2018-08-20 NOTE — TELEPHONE ENCOUNTER
Pt would like to know if she can get a letter from  stating she is released to go back to work for a training on thursday 8/23/18 since she is still on FMLA. Pt would like to letter faxed to [phone] Attn: Ju Devine.

## 2018-11-12 NOTE — HPI
Sarbjit Boateng is a 37 y.o.  female with IUP at 38w6d weeks gestation who presents to L&D for induction of labor secondary to abnormal  testing, BPP 6 for no breathing, for the second week in a row. Patient is undergoing  testing secondary to diet controlled gDM. Pertinent medical history for this pregnancy includes AMA with normal quad screening.  Patient reports normal fetal movement.  She denies contractions, vaginal bleeding and leakage of fluid.  Care this pregnancy has been with Dr. Garber  
impaired balance

## 2018-12-03 ENCOUNTER — OFFICE VISIT (OUTPATIENT)
Dept: OBSTETRICS AND GYNECOLOGY | Facility: CLINIC | Age: 38
End: 2018-12-03
Payer: COMMERCIAL

## 2018-12-03 VITALS
BODY MASS INDEX: 29.02 KG/M2 | HEIGHT: 67 IN | DIASTOLIC BLOOD PRESSURE: 70 MMHG | SYSTOLIC BLOOD PRESSURE: 116 MMHG | WEIGHT: 184.88 LBS

## 2018-12-03 DIAGNOSIS — Z01.419 ENCOUNTER FOR ANNUAL ROUTINE GYNECOLOGICAL EXAMINATION: Primary | ICD-10-CM

## 2018-12-03 PROCEDURE — 99395 PREV VISIT EST AGE 18-39: CPT | Mod: S$GLB,,, | Performed by: OBSTETRICS & GYNECOLOGY

## 2018-12-03 PROCEDURE — 99999 PR PBB SHADOW E&M-EST. PATIENT-LVL II: CPT | Mod: PBBFAC,,, | Performed by: OBSTETRICS & GYNECOLOGY

## 2018-12-03 NOTE — PROGRESS NOTES
"  Chief Complaint: Well Woman Exam, Irregular bleeding on Nexplanon     HPI:      Sarbjit Boateng is a 38 y.o.  who presents for annual exam. She is currently complaining of irregular bleeding on Nexplanon.    Ms. Boateng is currently sexually active with a single male partner. She declines STD screening today. Patient does not have regular monthly menses since placement of Nexplanon. She had one episode which lasted 4 weeks. Was light the entire time. Just weaned her daughter 2 weeks ago.  No LMP recorded. Patient is not currently having periods (Reason: Birth Control). She is currently using Nexplanon for contraception.    Previous Pap:  NILM, HPV negative (2017)    Ms. Boateng confirms that she wears her seatbelt when riding in the car and does not text while driving.     OB History      Para Term  AB Living    4 2 2   2 2    SAB TAB Ectopic Multiple Live Births    2     0 2          ROS:     GENERAL: Denies unintentional weight gain or weight loss. Feeling well overall.   SKIN: Denies rash or lesions.   HEENT: Denies headaches, or vision changes.   CARDIOVASCULAR: Denies palpitations or chest pain.   RESPIRATORY: Denies shortness of breath or dyspnea on exertion.  BREASTS: Denies pain, lumps, or nipple discharge.   ABDOMEN: Denies abdominal pain, constipation, diarrhea, nausea, vomiting, or change in appetite.   URINARY: Denies frequency, dysuria, hematuria.  NEUROLOGIC: Denies syncope or weakness.   PSYCHIATRIC: Denies depression, anxiety or mood swings.      Physical Exam:      PHYSICAL EXAM:  /70   Ht 5' 7" (1.702 m)   Wt 83.8 kg (184 lb 13.7 oz)   Breastfeeding? No   BMI 28.95 kg/m²   Body mass index is 28.95 kg/m².     APPEARANCE: Well nourished, well developed, in no acute distress.  PSYCH: Appropriate mood and affect.  SKIN: No acne or hirsutism  NECK: Neck symmetric without masses or thyromegaly  NODES: No inguinal, axillary, or supraclavicular lymph node " enlargement  CHEST: Normal respiratory effort.  ABDOMEN: Soft.  No tenderness or masses.   BREASTS: Symmetrical, no skin changes or visible lesions.  No palpable masses or nipple discharge bilaterally.  PELVIC: Normal external genitalia without lesions.  Normal hair distribution.  Adequate perineal body, normal urethral meatus.  Vagina moist and well rugated without lesions or discharge.  Cervix pink, without lesions, discharge or tenderness.  No significant cystocele or rectocele.  Bimanual exam shows uterus to be normal size, regular, mobile and nontender.  Adnexa without masses or tenderness.    EXTREMITIES: No edema.    Assessment/Plan:     Encounter for annual routine gynecological examination      Counseling:     Patient was counseled today on current ASCCP pap guidelines, the recommendation for yearly pelvic exams, healthy diet and exercise routines, breast self awareness and annual mammograms. She is to see her PCP for other health maintenance.       Use of the [facility] Patient Portal discussed and encouraged during today's visit.